# Patient Record
Sex: FEMALE | Race: WHITE | NOT HISPANIC OR LATINO | Employment: UNEMPLOYED | URBAN - NONMETROPOLITAN AREA
[De-identification: names, ages, dates, MRNs, and addresses within clinical notes are randomized per-mention and may not be internally consistent; named-entity substitution may affect disease eponyms.]

---

## 2024-02-02 ENCOUNTER — APPOINTMENT (OUTPATIENT)
Dept: OBSTETRICS AND GYNECOLOGY | Facility: CLINIC | Age: 44
End: 2024-02-02

## 2024-02-07 ENCOUNTER — APPOINTMENT (OUTPATIENT)
Dept: OBSTETRICS AND GYNECOLOGY | Facility: CLINIC | Age: 44
End: 2024-02-07

## 2024-07-07 ENCOUNTER — HOSPITAL ENCOUNTER (EMERGENCY)
Facility: HOSPITAL | Age: 44
Discharge: HOME | End: 2024-07-08
Attending: EMERGENCY MEDICINE
Payer: COMMERCIAL

## 2024-07-07 DIAGNOSIS — M25.552 ACUTE HIP PAIN, LEFT: ICD-10-CM

## 2024-07-07 DIAGNOSIS — M54.16 LUMBAR RADICULOPATHY: Primary | ICD-10-CM

## 2024-07-07 DIAGNOSIS — S76.012A STRAIN OF LEFT HIP, INITIAL ENCOUNTER: ICD-10-CM

## 2024-07-07 PROCEDURE — 99284 EMERGENCY DEPT VISIT MOD MDM: CPT

## 2024-07-07 ASSESSMENT — COLUMBIA-SUICIDE SEVERITY RATING SCALE - C-SSRS
2. HAVE YOU ACTUALLY HAD ANY THOUGHTS OF KILLING YOURSELF?: NO
2. HAVE YOU ACTUALLY HAD ANY THOUGHTS OF KILLING YOURSELF?: NO
1. IN THE PAST MONTH, HAVE YOU WISHED YOU WERE DEAD OR WISHED YOU COULD GO TO SLEEP AND NOT WAKE UP?: NO
1. IN THE PAST MONTH, HAVE YOU WISHED YOU WERE DEAD OR WISHED YOU COULD GO TO SLEEP AND NOT WAKE UP?: NO
6. HAVE YOU EVER DONE ANYTHING, STARTED TO DO ANYTHING, OR PREPARED TO DO ANYTHING TO END YOUR LIFE?: NO

## 2024-07-07 ASSESSMENT — PAIN SCALES - GENERAL: PAINLEVEL_OUTOF10: 10 - WORST POSSIBLE PAIN

## 2024-07-07 ASSESSMENT — PAIN DESCRIPTION - LOCATION: LOCATION: HIP

## 2024-07-07 ASSESSMENT — PAIN - FUNCTIONAL ASSESSMENT: PAIN_FUNCTIONAL_ASSESSMENT: 0-10

## 2024-07-08 ENCOUNTER — APPOINTMENT (OUTPATIENT)
Dept: RADIOLOGY | Facility: HOSPITAL | Age: 44
End: 2024-07-08
Payer: COMMERCIAL

## 2024-07-08 VITALS
HEIGHT: 60 IN | SYSTOLIC BLOOD PRESSURE: 163 MMHG | OXYGEN SATURATION: 100 % | TEMPERATURE: 98.4 F | RESPIRATION RATE: 18 BRPM | DIASTOLIC BLOOD PRESSURE: 95 MMHG | HEART RATE: 98 BPM | WEIGHT: 206 LBS | BODY MASS INDEX: 40.44 KG/M2

## 2024-07-08 PROCEDURE — 72100 X-RAY EXAM L-S SPINE 2/3 VWS: CPT | Mod: FOREIGN READ | Performed by: RADIOLOGY

## 2024-07-08 PROCEDURE — 73502 X-RAY EXAM HIP UNI 2-3 VIEWS: CPT | Mod: LT

## 2024-07-08 PROCEDURE — 96372 THER/PROPH/DIAG INJ SC/IM: CPT

## 2024-07-08 PROCEDURE — 2500000004 HC RX 250 GENERAL PHARMACY W/ HCPCS (ALT 636 FOR OP/ED)

## 2024-07-08 PROCEDURE — 72100 X-RAY EXAM L-S SPINE 2/3 VWS: CPT

## 2024-07-08 PROCEDURE — 73502 X-RAY EXAM HIP UNI 2-3 VIEWS: CPT | Mod: LEFT SIDE | Performed by: RADIOLOGY

## 2024-07-08 PROCEDURE — 2500000001 HC RX 250 WO HCPCS SELF ADMINISTERED DRUGS (ALT 637 FOR MEDICARE OP)

## 2024-07-08 RX ORDER — CYCLOBENZAPRINE HCL 10 MG
10 TABLET ORAL 3 TIMES DAILY PRN
Qty: 20 TABLET | Refills: 0 | Status: SHIPPED | OUTPATIENT
Start: 2024-07-08

## 2024-07-08 RX ORDER — PREDNISONE 20 MG/1
TABLET ORAL
Status: COMPLETED
Start: 2024-07-08 | End: 2024-07-08

## 2024-07-08 RX ORDER — KETOROLAC TROMETHAMINE 30 MG/ML
INJECTION, SOLUTION INTRAMUSCULAR; INTRAVENOUS
Status: COMPLETED
Start: 2024-07-08 | End: 2024-07-08

## 2024-07-08 RX ORDER — PREDNISONE 10 MG/1
TABLET ORAL DAILY
Qty: 30 TABLET | Refills: 0 | Status: SHIPPED | OUTPATIENT
Start: 2024-07-08 | End: 2024-07-19

## 2024-07-08 RX ORDER — KETOROLAC TROMETHAMINE 30 MG/ML
30 INJECTION, SOLUTION INTRAMUSCULAR; INTRAVENOUS ONCE
Status: COMPLETED | OUTPATIENT
Start: 2024-07-08 | End: 2024-07-08

## 2024-07-08 RX ORDER — PREDNISONE 20 MG/1
40 TABLET ORAL ONCE
Status: COMPLETED | OUTPATIENT
Start: 2024-07-08 | End: 2024-07-08

## 2024-07-08 RX ORDER — LIDOCAINE 50 MG/G
1 PATCH TOPICAL DAILY
Qty: 15 PATCH | Refills: 0 | Status: SHIPPED | OUTPATIENT
Start: 2024-07-08

## 2024-07-08 RX ORDER — NAPROXEN 500 MG/1
500 TABLET ORAL 2 TIMES DAILY PRN
Qty: 28 TABLET | Refills: 0 | Status: SHIPPED | OUTPATIENT
Start: 2024-07-08 | End: 2024-07-22

## 2024-07-08 RX ORDER — CYCLOBENZAPRINE HCL 5 MG
TABLET ORAL
Status: COMPLETED
Start: 2024-07-08 | End: 2024-07-08

## 2024-07-08 RX ORDER — CYCLOBENZAPRINE HCL 5 MG
10 TABLET ORAL ONCE
Status: COMPLETED | OUTPATIENT
Start: 2024-07-08 | End: 2024-07-08

## 2024-07-08 RX ADMIN — CYCLOBENZAPRINE HYDROCHLORIDE 10 MG: 5 TABLET, FILM COATED ORAL at 00:15

## 2024-07-08 RX ADMIN — KETOROLAC TROMETHAMINE 30 MG: 30 INJECTION, SOLUTION INTRAMUSCULAR; INTRAVENOUS at 00:15

## 2024-07-08 RX ADMIN — PREDNISONE 40 MG: 20 TABLET ORAL at 00:15

## 2024-07-08 RX ADMIN — Medication 10 MG: at 00:15

## 2024-07-08 RX ADMIN — KETOROLAC TROMETHAMINE 30 MG: 30 INJECTION, SOLUTION INTRAMUSCULAR at 00:15

## 2024-07-08 ASSESSMENT — PAIN SCALES - GENERAL
PAINLEVEL_OUTOF10: 7
PAINLEVEL_OUTOF10: 10 - WORST POSSIBLE PAIN

## 2024-07-08 ASSESSMENT — PAIN DESCRIPTION - PROGRESSION: CLINICAL_PROGRESSION: GRADUALLY IMPROVING

## 2024-07-08 ASSESSMENT — PAIN DESCRIPTION - ORIENTATION: ORIENTATION: LEFT

## 2024-07-08 ASSESSMENT — PAIN - FUNCTIONAL ASSESSMENT: PAIN_FUNCTIONAL_ASSESSMENT: 0-10

## 2024-07-08 ASSESSMENT — PAIN DESCRIPTION - DESCRIPTORS: DESCRIPTORS: ACHING;SHARP

## 2024-07-08 ASSESSMENT — PAIN DESCRIPTION - LOCATION
LOCATION: HIP
LOCATION: HIP

## 2024-07-08 ASSESSMENT — PAIN DESCRIPTION - PAIN TYPE: TYPE: ACUTE PAIN

## 2024-07-08 ASSESSMENT — PAIN DESCRIPTION - FREQUENCY: FREQUENCY: CONSTANT/CONTINUOUS

## 2024-07-08 NOTE — ED TRIAGE NOTES
Patient thinks she may of popped her hip out. She had the same symptoms last year when it happened. Complaining of left hip and left leg pain. Happened Saturday morning

## 2024-07-08 NOTE — ED NOTES
Patient given discharge instructions and verbalizes understanding. Pt aware of prescriptions ordered and denies any further questions. Pt left ambulatory with .      Estefania Liriano RN  07/08/24 4567

## 2024-07-08 NOTE — ED PROVIDER NOTES
HPI   Chief Complaint   Patient presents with    Hip Pain    Leg Pain       44-year-old female presents for evaluation of left hip pain.  She injured her left hip after stepping down out of her camper.  She has been trying stretching without relief.  No direct trauma to the left hip.  No bowel or bladder incontinence saddle anesthesia or leg weakness.  No other associated injuries.  She just finished her menstrual cycle.  Denies pregnancy.      History provided by:  Patient and spouse                      [place] Coma Scale Score: 15                     Patient History   History reviewed. No pertinent past medical history.  History reviewed. No pertinent surgical history.  No family history on file.  Social History     Tobacco Use    Smoking status: Never    Smokeless tobacco: Never   Vaping Use    Vaping status: Never Used   Substance Use Topics    Alcohol use: Never    Drug use: Never       Physical Exam   ED Triage Vitals [07/07/24 2334]   Temperature Heart Rate Respirations BP   36.9 °C (98.4 °F) (!) 110 20 (!) 187/95      Pulse Ox Temp Source Heart Rate Source Patient Position   (!) 10 % Tympanic -- --      BP Location FiO2 (%)     -- --       Physical Exam  Vitals and nursing note reviewed.   Constitutional:       General: She is not in acute distress.     Appearance: She is well-developed.   HENT:      Head: Normocephalic and atraumatic.   Eyes:      Conjunctiva/sclera: Conjunctivae normal.   Cardiovascular:      Rate and Rhythm: Normal rate and regular rhythm.      Heart sounds: No murmur heard.  Pulmonary:      Effort: Pulmonary effort is normal. No respiratory distress.      Breath sounds: Normal breath sounds.   Abdominal:      Palpations: Abdomen is soft.      Tenderness: There is no abdominal tenderness.   Musculoskeletal:         General: Tenderness present. No swelling.      Cervical back: Neck supple.      Comments: Left hip tenderness to palpation without deformity   Skin:     General: Skin is warm  and dry.      Capillary Refill: Capillary refill takes less than 2 seconds.   Neurological:      Mental Status: She is alert.   Psychiatric:         Mood and Affect: Mood normal.         ED Course & MDM   ED Course as of 07/08/24 0243 Mon Jul 08, 2024   0003 44-year-old female presents with left hip pain.  Atraumatic.  Toradol for pain.  She just finished her menstrual cycle and denies pregnancy.  X-ray left hip with AP pelvis.  Toradol for pain. [BT]   0040 XR lumbar spine 2-3 views  Independent interpretation of x-ray lumbar spine preliminary read by myself negative for compression fracture or subluxation. [BT]   0042 XR hip left with pelvis when performed 2 or 3 views  Independent interpretation of x-ray left hip and AP pelvis read by myself x-ray left hip with AP pelvis negative for pelvic ring fracture.  No traumatic fracture of left hip. [BT]   0203 Patient ambulatory to the bathroom with a steady gait [BT]   0229 Final reads of x-ray of lumbar spine and x-ray left hip with AP pelvis negative for fractures or traumatic malalignment of the lumbar spine. [BT]   0229 Pain improved with treatment. [BT]   0242 I considered admission.  Pain improved with treatment.  She is ambulatory.  Most likely lumbar radiculopathy.  No traumatic injuries on imaging.    She is safe for trial of outpatient management.  She was prescribed Flexeril, prednisone taper, Naprosyn, and lidocaine patches for home.    Referred to PCP for follow up.  Advised to return for repeat evaluation in 12 hours or sooner with intractable pain or any other concerns.  Patient agreeable with plan and verbalized understanding.  Discharged home. [BT]      ED Course User Index  [BT] Omero Young DO         Diagnoses as of 07/08/24 0243   Lumbar radiculopathy   Acute hip pain, left   Strain of left hip, initial encounter       Medical Decision Making      Procedure  Procedures     Omero Young,   07/08/24 0243

## 2024-07-08 NOTE — ED NOTES
Patient states she just got off her period, no chance for pregnancy. OK to not wait for pregnancy test for xray per Dr. Young.     Estefania Liriano RN  07/08/24 0031

## 2024-07-08 NOTE — ED NOTES
"Patient states she was stepping down her camper steps when her leg stretched too far and she hurt her left hip. She then attempted to do stretches that she learned last time she injured her hip and she states that she heard a \"pop\". Pt states that she has been an excruciating pain ever since.      Estefania Liriano RN  07/07/24 5542    "

## 2024-08-02 ENCOUNTER — APPOINTMENT (OUTPATIENT)
Dept: RADIOLOGY | Facility: HOSPITAL | Age: 44
End: 2024-08-02
Payer: COMMERCIAL

## 2024-08-02 ENCOUNTER — HOSPITAL ENCOUNTER (EMERGENCY)
Facility: HOSPITAL | Age: 44
Discharge: HOME | End: 2024-08-02
Payer: COMMERCIAL

## 2024-08-02 VITALS
RESPIRATION RATE: 15 BRPM | HEART RATE: 96 BPM | OXYGEN SATURATION: 99 % | WEIGHT: 206 LBS | BODY MASS INDEX: 40.44 KG/M2 | TEMPERATURE: 98.4 F | SYSTOLIC BLOOD PRESSURE: 145 MMHG | DIASTOLIC BLOOD PRESSURE: 78 MMHG | HEIGHT: 60 IN

## 2024-08-02 DIAGNOSIS — M54.41 ACUTE BILATERAL LOW BACK PAIN WITH BILATERAL SCIATICA: Primary | ICD-10-CM

## 2024-08-02 DIAGNOSIS — M54.42 ACUTE BILATERAL LOW BACK PAIN WITH BILATERAL SCIATICA: Primary | ICD-10-CM

## 2024-08-02 PROCEDURE — 99283 EMERGENCY DEPT VISIT LOW MDM: CPT

## 2024-08-02 PROCEDURE — 72100 X-RAY EXAM L-S SPINE 2/3 VWS: CPT

## 2024-08-02 PROCEDURE — 72100 X-RAY EXAM L-S SPINE 2/3 VWS: CPT | Performed by: RADIOLOGY

## 2024-08-02 PROCEDURE — 2500000004 HC RX 250 GENERAL PHARMACY W/ HCPCS (ALT 636 FOR OP/ED)

## 2024-08-02 PROCEDURE — 96372 THER/PROPH/DIAG INJ SC/IM: CPT

## 2024-08-02 RX ORDER — CYCLOBENZAPRINE HCL 10 MG
10 TABLET ORAL 2 TIMES DAILY PRN
Qty: 20 TABLET | Refills: 0 | Status: SHIPPED | OUTPATIENT
Start: 2024-08-02 | End: 2024-08-12

## 2024-08-02 RX ORDER — IBUPROFEN 800 MG/1
800 TABLET ORAL 3 TIMES DAILY
Qty: 21 TABLET | Refills: 0 | Status: SHIPPED | OUTPATIENT
Start: 2024-08-02 | End: 2024-08-09

## 2024-08-02 RX ORDER — ORPHENADRINE CITRATE 30 MG/ML
60 INJECTION INTRAMUSCULAR; INTRAVENOUS ONCE
Status: COMPLETED | OUTPATIENT
Start: 2024-08-02 | End: 2024-08-02

## 2024-08-02 RX ORDER — OXYCODONE AND ACETAMINOPHEN 5; 325 MG/1; MG/1
1 TABLET ORAL EVERY 6 HOURS PRN
Qty: 12 TABLET | Refills: 0 | Status: SHIPPED | OUTPATIENT
Start: 2024-08-02 | End: 2024-08-05

## 2024-08-02 RX ORDER — KETOROLAC TROMETHAMINE 30 MG/ML
30 INJECTION, SOLUTION INTRAMUSCULAR; INTRAVENOUS ONCE
Status: COMPLETED | OUTPATIENT
Start: 2024-08-02 | End: 2024-08-02

## 2024-08-02 RX ADMIN — ORPHENADRINE CITRATE 60 MG: 60 INJECTION INTRAMUSCULAR; INTRAVENOUS at 16:57

## 2024-08-02 RX ADMIN — KETOROLAC TROMETHAMINE 30 MG: 30 INJECTION, SOLUTION INTRAMUSCULAR at 16:57

## 2024-08-02 ASSESSMENT — PAIN DESCRIPTION - LOCATION: LOCATION: BACK

## 2024-08-02 ASSESSMENT — PAIN DESCRIPTION - PAIN TYPE: TYPE: ACUTE PAIN

## 2024-08-02 ASSESSMENT — PAIN DESCRIPTION - ONSET: ONSET: AWAKENED FROM SLEEP

## 2024-08-02 ASSESSMENT — COLUMBIA-SUICIDE SEVERITY RATING SCALE - C-SSRS
6. HAVE YOU EVER DONE ANYTHING, STARTED TO DO ANYTHING, OR PREPARED TO DO ANYTHING TO END YOUR LIFE?: NO
1. IN THE PAST MONTH, HAVE YOU WISHED YOU WERE DEAD OR WISHED YOU COULD GO TO SLEEP AND NOT WAKE UP?: NO
2. HAVE YOU ACTUALLY HAD ANY THOUGHTS OF KILLING YOURSELF?: NO

## 2024-08-02 ASSESSMENT — PAIN DESCRIPTION - PROGRESSION: CLINICAL_PROGRESSION: NOT CHANGED

## 2024-08-02 ASSESSMENT — PAIN DESCRIPTION - ORIENTATION: ORIENTATION: MID

## 2024-08-02 ASSESSMENT — PAIN DESCRIPTION - DESCRIPTORS: DESCRIPTORS: ACHING

## 2024-08-02 ASSESSMENT — PAIN DESCRIPTION - FREQUENCY: FREQUENCY: CONSTANT/CONTINUOUS

## 2024-08-02 ASSESSMENT — PAIN - FUNCTIONAL ASSESSMENT: PAIN_FUNCTIONAL_ASSESSMENT: 0-10

## 2024-08-02 ASSESSMENT — PAIN SCALES - GENERAL
PAINLEVEL_OUTOF10: 3
PAINLEVEL_OUTOF10: 8
PAINLEVEL_OUTOF10: 8

## 2024-08-02 NOTE — ED PROVIDER NOTES
HPI   Chief Complaint   Patient presents with    Back Pain     Fell three days ago having lower back pain today       HPI  Patient is a 44-year-old female who presents to ED for lower back pain x 3 days.  Patient states she fell 3 days ago.  She states she fell on her butt, denies any head injury or blood thinner use.  Denies any other injuries to the extremities.  She complains of lumbar strain with bilateral sciatica.  Patient denies any back pain red flags.  She denies any specific complaints of fever or chills, cough or congestion, headache or dizziness, abdominal pain or NVD, chest pain or shortness of breath, urinary symptoms, IV drug use, saddle anesthesia, incontinence of bowel or bladder.  Denies any recent hospitalizations or surgeries.  Denies any recent travel or sick contacts.      Patient History   History reviewed. No pertinent past medical history.  History reviewed. No pertinent surgical history.  No family history on file.  Social History     Tobacco Use    Smoking status: Never    Smokeless tobacco: Never   Vaping Use    Vaping status: Never Used   Substance Use Topics    Alcohol use: Never    Drug use: Never       Physical Exam   ED Triage Vitals   Temperature Heart Rate Respirations BP   08/02/24 1459 08/02/24 1459 08/02/24 1459 08/02/24 1501   37 °C (98.6 °F) (!) 120 20 (!) 167/111      Pulse Ox Temp Source Heart Rate Source Patient Position   08/02/24 1459 08/02/24 1459 08/02/24 1459 08/02/24 1459   100 % Oral Monitor Sitting      BP Location FiO2 (%)     08/02/24 1459 --     Right arm        Physical Exam  Vitals reviewed.   Constitutional:       Appearance: Normal appearance.   HENT:      Head: Normocephalic and atraumatic.   Eyes:      Extraocular Movements: Extraocular movements intact.   Cardiovascular:      Rate and Rhythm: Normal rate and regular rhythm.   Pulmonary:      Effort: Pulmonary effort is normal.      Breath sounds: Normal breath sounds.   Abdominal:      General: Abdomen is  flat.      Palpations: Abdomen is soft.      Tenderness: There is no abdominal tenderness.   Musculoskeletal:         General: Normal range of motion.      Cervical back: Normal range of motion and neck supple.   Skin:     General: Skin is warm and dry.   Neurological:      General: No focal deficit present.      Mental Status: She is alert and oriented to person, place, and time.      Sensory: Sensation is intact.      Motor: Motor function is intact.      Coordination: Coordination is intact.      Gait: Gait is intact.      Comments: No focal neurologic deficits.  No sensory deficits.  Patient is ambulatory independently.   Psychiatric:         Mood and Affect: Mood normal.         Behavior: Behavior normal.           ED Course & MDM   Diagnoses as of 08/02/24 1817   Acute bilateral low back pain with bilateral sciatica                       [place] Coma Scale Score: 15                        Medical Decision Making  Parts of this chart have been completed using voice recognition software. Please excuse any errors of transcription.  My thought process and reason for plan has been formulated from the time that I saw the patient until the time of disposition and is not specific to one specific moment during their visit and furthermore my MDM encompasses this entire chart and not only this text box.    HPI:   Detailed above.    Exam:   A medically appropriate exam performed, outlined above, given the known history and presentation.    History obtained from:   Patient    EKG/Cardiac monitor:     Social Determinants of Health considered during this visit:       Medications given during visit:  Medications   ketorolac (Toradol) injection 30 mg (30 mg intramuscular Given 8/2/24 1657)   orphenadrine (Norflex) injection 60 mg (60 mg intramuscular Given 8/2/24 1657)        Diagnostic/tests:  Labs Reviewed - No data to display   XR lumbar spine 2-3 views   Final Result   1. Mild diffuse degenerative changes as above.         MACRO:   None.        Signed by: Roxanne Lee 8/2/2024 5:18 PM   Dictation workstation:   [id number]           Differential Diagnosis:   As I have deemed necessary from their history, physical, and studies, I have considered the following diagnoses:     SPINAL CORD COMPRESSION  SPINAL EPIDURAL ABSCESS  METASTATIC CANCER  VERTEBRAL COMPRESSION FRACTURE  RADICULOPATHY  SPINAL STENOSIS  OSTEOARTHRITIS  SCOLIOSIS  PIRIFORMIS SYNDROME  LUMBAR STRAIN  SCIATICA    No emergent MRI indicated  I completed a structured, evidence-based clinical evaluation to screen for acute non-traumatic spinal emergencies. The patient has a normal detailed neurologic exam and a low red flag score. The evidence indicates that the patient is very low risk for an acute spinal emergency and this is consistent with my clinical intuition. The risk of further workup is higher than the likelihood of the patient having a spinal epidural abscess or other dangerous emergency spinal condition. It is, therefore, in the patient´s best interest not to do additional emergent testing at this time. I have discussed with the patient my clinical impression and the result of an evidence-based clinical evaluation to screen for spinal epidural abscess and other spinal emergencies, as well as the risk of further testing and hospitalization. The evidence shows that the risk for an acute spinal emergency is less than 1%. Although the risk of an acute spinal emergency has not been completely eliminated, the risks of further testing likely exceed any potential benefit, and the patient agrees with not pursuing further emergent evaluation for causes of back pain at this time.    BACK PAIN RED FLAGS    Minor (1 Point Each)  Alcohol Abuse  Diabetes Mellitus  Renal Failure  Night Pain  3rd Visit in last 20 days    Major (3 Points Each)  IV Drug Use  Fever without focus  Recent/Current Systemic Infection  Immunosuppression  Recent Spinal  Fracture/Procedure  Incontinence or Retention  Indwelling Urinary Catheter    Consultations:      Procedures:      Critical Care:      Referrals:      Discharge Prescriptions:      MDM Summary:  No acute findings on x-ray of lumbar spine.  Patient reports significant improvement of symptoms after medications.  Will provide prescriptions for pain management.  Patient will follow-up with primary care provider.  Return precautions were discussed.  Patient understands and agrees with this plan.    We have discussed the diagnosis and risks, and we agree with discharging home to follow-up with appropriate physician as directed. We also discussed returning to the Emergency Department immediately if new or worsening symptoms occur. We have discussed the symptoms which are most concerning that necessitate immediate return. Pt symptoms have been well controlled here and the patient is safe for discharge with appropriate outpatient follow up. The patient has verbalized understanding to return to ER without delay for new or worsening pains or for any other symptoms or concerns. I utilized the discharge clinical management tool provided [employer] to help estimate risk of negative outcome for this patient.      Disposition:  The patient was discharged      Procedure  Procedures     Maury Ballesteros PA-C  08/02/24 1862

## 2024-08-30 ENCOUNTER — APPOINTMENT (OUTPATIENT)
Dept: RADIOLOGY | Facility: HOSPITAL | Age: 44
End: 2024-08-30
Payer: COMMERCIAL

## 2024-08-30 ENCOUNTER — HOSPITAL ENCOUNTER (EMERGENCY)
Facility: HOSPITAL | Age: 44
Discharge: HOME | End: 2024-08-30
Attending: STUDENT IN AN ORGANIZED HEALTH CARE EDUCATION/TRAINING PROGRAM
Payer: COMMERCIAL

## 2024-08-30 ENCOUNTER — APPOINTMENT (OUTPATIENT)
Dept: CARDIOLOGY | Facility: HOSPITAL | Age: 44
End: 2024-08-30
Payer: COMMERCIAL

## 2024-08-30 VITALS
BODY MASS INDEX: 43.19 KG/M2 | HEIGHT: 60 IN | RESPIRATION RATE: 16 BRPM | OXYGEN SATURATION: 98 % | TEMPERATURE: 98.2 F | SYSTOLIC BLOOD PRESSURE: 195 MMHG | WEIGHT: 220 LBS | DIASTOLIC BLOOD PRESSURE: 105 MMHG | HEART RATE: 102 BPM

## 2024-08-30 DIAGNOSIS — M54.9 BACK PAIN, UNSPECIFIED BACK LOCATION, UNSPECIFIED BACK PAIN LATERALITY, UNSPECIFIED CHRONICITY: ICD-10-CM

## 2024-08-30 DIAGNOSIS — M54.30 SCIATIC LEG PAIN: Primary | ICD-10-CM

## 2024-08-30 LAB
ALBUMIN SERPL BCP-MCNC: 4.2 G/DL (ref 3.4–5)
ALP SERPL-CCNC: 58 U/L (ref 33–110)
ALT SERPL W P-5'-P-CCNC: 14 U/L (ref 7–45)
AMORPH CRY #/AREA UR COMP ASSIST: ABNORMAL /HPF
ANION GAP SERPL CALC-SCNC: 13 MMOL/L (ref 10–20)
APPEARANCE UR: CLEAR
AST SERPL W P-5'-P-CCNC: 15 U/L (ref 9–39)
ATRIAL RATE: 109 BPM
BACTERIA #/AREA URNS AUTO: ABNORMAL /HPF
BASOPHILS # BLD AUTO: 0.07 X10*3/UL (ref 0–0.1)
BASOPHILS NFR BLD AUTO: 0.6 %
BILIRUB SERPL-MCNC: 0.4 MG/DL (ref 0–1.2)
BILIRUB UR STRIP.AUTO-MCNC: NEGATIVE MG/DL
BUN SERPL-MCNC: 13 MG/DL (ref 6–23)
CALCIUM SERPL-MCNC: 9.9 MG/DL (ref 8.6–10.3)
CARDIAC TROPONIN I PNL SERPL HS: 7 NG/L (ref 0–13)
CHLORIDE SERPL-SCNC: 96 MMOL/L (ref 98–107)
CO2 SERPL-SCNC: 30 MMOL/L (ref 21–32)
COLOR UR: ABNORMAL
CREAT SERPL-MCNC: 0.8 MG/DL (ref 0.5–1.05)
EGFRCR SERPLBLD CKD-EPI 2021: >90 ML/MIN/1.73M*2
EOSINOPHIL # BLD AUTO: 0.24 X10*3/UL (ref 0–0.7)
EOSINOPHIL NFR BLD AUTO: 2.1 %
ERYTHROCYTE [DISTWIDTH] IN BLOOD BY AUTOMATED COUNT: 14.1 % (ref 11.5–14.5)
FLUAV RNA RESP QL NAA+PROBE: NOT DETECTED
FLUBV RNA RESP QL NAA+PROBE: NOT DETECTED
GLUCOSE SERPL-MCNC: 113 MG/DL (ref 74–99)
GLUCOSE UR STRIP.AUTO-MCNC: NORMAL MG/DL
HCT VFR BLD AUTO: 39.1 % (ref 36–46)
HGB BLD-MCNC: 12.3 G/DL (ref 12–16)
HOLD SPECIMEN: NORMAL
IMM GRANULOCYTES # BLD AUTO: 0.05 X10*3/UL (ref 0–0.7)
IMM GRANULOCYTES NFR BLD AUTO: 0.4 % (ref 0–0.9)
KETONES UR STRIP.AUTO-MCNC: NEGATIVE MG/DL
LACTATE SERPL-SCNC: 1.1 MMOL/L (ref 0.4–2)
LEUKOCYTE ESTERASE UR QL STRIP.AUTO: NEGATIVE
LIPASE SERPL-CCNC: 16 U/L (ref 9–82)
LYMPHOCYTES # BLD AUTO: 2.19 X10*3/UL (ref 1.2–4.8)
LYMPHOCYTES NFR BLD AUTO: 19 %
MAGNESIUM SERPL-MCNC: 1.98 MG/DL (ref 1.6–2.4)
MCH RBC QN AUTO: 26.5 PG (ref 26–34)
MCHC RBC AUTO-ENTMCNC: 31.5 G/DL (ref 32–36)
MCV RBC AUTO: 84 FL (ref 80–100)
MONOCYTES # BLD AUTO: 1.09 X10*3/UL (ref 0.1–1)
MONOCYTES NFR BLD AUTO: 9.5 %
MUCOUS THREADS #/AREA URNS AUTO: ABNORMAL /LPF
NEUTROPHILS # BLD AUTO: 7.88 X10*3/UL (ref 1.2–7.7)
NEUTROPHILS NFR BLD AUTO: 68.4 %
NITRITE UR QL STRIP.AUTO: NEGATIVE
NRBC BLD-RTO: 0 /100 WBCS (ref 0–0)
P AXIS: 71 DEGREES
P OFFSET: 185 MS
P ONSET: 128 MS
PH UR STRIP.AUTO: 6.5 [PH]
PLATELET # BLD AUTO: 317 X10*3/UL (ref 150–450)
POTASSIUM SERPL-SCNC: 3.6 MMOL/L (ref 3.5–5.3)
PR INTERVAL: 184 MS
PROT SERPL-MCNC: 7.4 G/DL (ref 6.4–8.2)
PROT UR STRIP.AUTO-MCNC: NEGATIVE MG/DL
Q ONSET: 220 MS
QRS COUNT: 17 BEATS
QRS DURATION: 90 MS
QT INTERVAL: 334 MS
QTC CALCULATION(BAZETT): 449 MS
QTC FREDERICIA: 407 MS
R AXIS: 53 DEGREES
RBC # BLD AUTO: 4.64 X10*6/UL (ref 4–5.2)
RBC # UR STRIP.AUTO: ABNORMAL /UL
RBC #/AREA URNS AUTO: ABNORMAL /HPF
SARS-COV-2 RNA RESP QL NAA+PROBE: NOT DETECTED
SODIUM SERPL-SCNC: 135 MMOL/L (ref 136–145)
SP GR UR STRIP.AUTO: 1.01
SQUAMOUS #/AREA URNS AUTO: ABNORMAL /HPF
T AXIS: 80 DEGREES
T OFFSET: 387 MS
UROBILINOGEN UR STRIP.AUTO-MCNC: NORMAL MG/DL
VENTRICULAR RATE: 109 BPM
WBC # BLD AUTO: 11.5 X10*3/UL (ref 4.4–11.3)
WBC #/AREA URNS AUTO: ABNORMAL /HPF

## 2024-08-30 PROCEDURE — 99285 EMERGENCY DEPT VISIT HI MDM: CPT | Mod: 25

## 2024-08-30 PROCEDURE — 2500000004 HC RX 250 GENERAL PHARMACY W/ HCPCS (ALT 636 FOR OP/ED): Performed by: STUDENT IN AN ORGANIZED HEALTH CARE EDUCATION/TRAINING PROGRAM

## 2024-08-30 PROCEDURE — 84484 ASSAY OF TROPONIN QUANT: CPT | Performed by: STUDENT IN AN ORGANIZED HEALTH CARE EDUCATION/TRAINING PROGRAM

## 2024-08-30 PROCEDURE — 96375 TX/PRO/DX INJ NEW DRUG ADDON: CPT

## 2024-08-30 PROCEDURE — 74177 CT ABD & PELVIS W/CONTRAST: CPT | Performed by: RADIOLOGY

## 2024-08-30 PROCEDURE — 93005 ELECTROCARDIOGRAM TRACING: CPT

## 2024-08-30 PROCEDURE — 80053 COMPREHEN METABOLIC PANEL: CPT | Performed by: STUDENT IN AN ORGANIZED HEALTH CARE EDUCATION/TRAINING PROGRAM

## 2024-08-30 PROCEDURE — 83735 ASSAY OF MAGNESIUM: CPT | Performed by: STUDENT IN AN ORGANIZED HEALTH CARE EDUCATION/TRAINING PROGRAM

## 2024-08-30 PROCEDURE — 87635 SARS-COV-2 COVID-19 AMP PRB: CPT | Performed by: STUDENT IN AN ORGANIZED HEALTH CARE EDUCATION/TRAINING PROGRAM

## 2024-08-30 PROCEDURE — 71045 X-RAY EXAM CHEST 1 VIEW: CPT | Performed by: RADIOLOGY

## 2024-08-30 PROCEDURE — 83690 ASSAY OF LIPASE: CPT | Performed by: STUDENT IN AN ORGANIZED HEALTH CARE EDUCATION/TRAINING PROGRAM

## 2024-08-30 PROCEDURE — 85025 COMPLETE CBC W/AUTO DIFF WBC: CPT | Performed by: STUDENT IN AN ORGANIZED HEALTH CARE EDUCATION/TRAINING PROGRAM

## 2024-08-30 PROCEDURE — 74177 CT ABD & PELVIS W/CONTRAST: CPT

## 2024-08-30 PROCEDURE — 2550000001 HC RX 255 CONTRASTS: Performed by: STUDENT IN AN ORGANIZED HEALTH CARE EDUCATION/TRAINING PROGRAM

## 2024-08-30 PROCEDURE — 83605 ASSAY OF LACTIC ACID: CPT | Performed by: STUDENT IN AN ORGANIZED HEALTH CARE EDUCATION/TRAINING PROGRAM

## 2024-08-30 PROCEDURE — 71045 X-RAY EXAM CHEST 1 VIEW: CPT

## 2024-08-30 PROCEDURE — 81001 URINALYSIS AUTO W/SCOPE: CPT | Performed by: STUDENT IN AN ORGANIZED HEALTH CARE EDUCATION/TRAINING PROGRAM

## 2024-08-30 PROCEDURE — 96374 THER/PROPH/DIAG INJ IV PUSH: CPT | Mod: 59

## 2024-08-30 PROCEDURE — 36415 COLL VENOUS BLD VENIPUNCTURE: CPT | Performed by: STUDENT IN AN ORGANIZED HEALTH CARE EDUCATION/TRAINING PROGRAM

## 2024-08-30 RX ORDER — KETOROLAC TROMETHAMINE 15 MG/ML
15 INJECTION, SOLUTION INTRAMUSCULAR; INTRAVENOUS ONCE
Status: COMPLETED | OUTPATIENT
Start: 2024-08-30 | End: 2024-08-30

## 2024-08-30 RX ORDER — MORPHINE SULFATE 4 MG/ML
4 INJECTION, SOLUTION INTRAMUSCULAR; INTRAVENOUS ONCE
Status: COMPLETED | OUTPATIENT
Start: 2024-08-30 | End: 2024-08-30

## 2024-08-30 RX ORDER — METHOCARBAMOL 100 MG/ML
1000 INJECTION, SOLUTION INTRAMUSCULAR; INTRAVENOUS ONCE
Status: COMPLETED | OUTPATIENT
Start: 2024-08-30 | End: 2024-08-30

## 2024-08-30 ASSESSMENT — PAIN SCALES - GENERAL
PAINLEVEL_OUTOF10: 10 - WORST POSSIBLE PAIN
PAINLEVEL_OUTOF10: 10 - WORST POSSIBLE PAIN

## 2024-08-30 ASSESSMENT — PAIN - FUNCTIONAL ASSESSMENT: PAIN_FUNCTIONAL_ASSESSMENT: 0-10

## 2024-08-30 ASSESSMENT — PAIN DESCRIPTION - PAIN TYPE: TYPE: ACUTE PAIN

## 2024-08-30 ASSESSMENT — PAIN DESCRIPTION - ORIENTATION
ORIENTATION: LEFT
ORIENTATION: LEFT

## 2024-08-30 ASSESSMENT — PAIN DESCRIPTION - LOCATION
LOCATION: LEG

## 2024-08-30 ASSESSMENT — PAIN DESCRIPTION - DESCRIPTORS: DESCRIPTORS: ACHING

## 2024-08-30 NOTE — ED PROVIDER NOTES
Chief Complaint: Back pain  HPI: This is a 44-year-old female, past medical history significant for sciatic back pain for the last several months, presenting to the emergency department for worsening weakness and tingling of her left lower extremity as well as worsening back pain.  Patient denies any new injury or trauma to her back.  She denies any fevers or chills.  She states that it is difficult for her to urinate and to have bowel movements, however denies any incontinence.  She states she is unable to control the pain at home and so presents to the emergency department.    No past medical history on file.   No past surgical history on file.    Physical Exam  Constitutional:       Appearance: Normal appearance.   HENT:      Head: Normocephalic and atraumatic.      Mouth/Throat:      Mouth: Mucous membranes are moist.   Eyes:      Conjunctiva/sclera: Conjunctivae normal.   Cardiovascular:      Rate and Rhythm: Normal rate.   Pulmonary:      Effort: Pulmonary effort is normal.   Abdominal:      General: Abdomen is flat.      Palpations: Abdomen is soft.   Musculoskeletal:         General: No swelling, tenderness, deformity or signs of injury. Normal range of motion.      Cervical back: Normal range of motion.      Comments: No midline bony tenderness of the C/T/L-spine.   Skin:     General: Skin is warm and dry.   Neurological:      General: No focal deficit present.      Mental Status: She is alert.      Cranial Nerves: No cranial nerve deficit.      Sensory: No sensory deficit.      Motor: No weakness.      Coordination: Coordination normal.   Psychiatric:         Mood and Affect: Mood normal.          ED Course/[facility]  Diagnoses as of 08/30/24 0710   Sciatic leg pain   Back pain, unspecified back location, unspecified back pain laterality, unspecified chronicity       This is a 44 y.o. female presenting to the ED for evaluation of acute on chronic back pain and left lower extremity weakness and tingling which  has been present for the last several months.  On physical exam, the patient appears uncomfortable, resting on her right side.  It is painful for her to roll over onto her back, however she is able to do it.  She has normal sensation, no saddle paresthesias, and she is able to move both upper and lower extremities.  We did do a postvoid residual bladder scan which was 22 cc.  Lab work was also grossly unremarkable.  I have a very low suspicion for cauda equina at this time, as such I do not feel that MRI imaging is indicated.  I did do a CT abdomen pelvis as the patient complains of difficulty with urination and constipation, however CT abdomen pelvis was grossly unremarkable.  Patient was given morphine as well as Robaxin with minimal improvement in the pain.  She was then given 0.5 mg of Dilaudid and Toradol, with significant improvement in her pain.  She feels comfortable with discharge home to continue following up with her spine doctors and physical therapy.  She was encouraged to return to the emergency department for any new or worsening symptoms and was ultimately discharged home in stable condition.    Final Impression  1.  Sciatica  Disposition/Plan: Discharge home  Condition at disposition: Stable.     Stacey Redman DO  Emergency Medicine Physician     Stacey Redman DO  08/30/24 0715

## 2024-08-30 NOTE — ED NOTES
Patient given discharge instructions and verbalizes understanding. Pt brought to waiting room via wheelchair to wait for ride. Pt left with all belongings.      Estefania Liriano RN  08/30/24 0772

## 2024-09-13 ENCOUNTER — APPOINTMENT (OUTPATIENT)
Dept: RADIOLOGY | Facility: HOSPITAL | Age: 44
End: 2024-09-13
Payer: COMMERCIAL

## 2024-09-13 ENCOUNTER — HOSPITAL ENCOUNTER (EMERGENCY)
Facility: HOSPITAL | Age: 44
Discharge: OTHER INSTITUTION | End: 2024-09-14
Attending: STUDENT IN AN ORGANIZED HEALTH CARE EDUCATION/TRAINING PROGRAM
Payer: COMMERCIAL

## 2024-09-13 DIAGNOSIS — G89.29 ACUTE EXACERBATION OF CHRONIC LOW BACK PAIN: Primary | ICD-10-CM

## 2024-09-13 DIAGNOSIS — M54.50 ACUTE EXACERBATION OF CHRONIC LOW BACK PAIN: Primary | ICD-10-CM

## 2024-09-13 LAB
ALBUMIN SERPL-MCNC: 3.9 G/DL (ref 3.5–5)
ALP BLD-CCNC: 70 U/L (ref 35–125)
ALT SERPL-CCNC: 10 U/L (ref 5–40)
ANION GAP SERPL CALC-SCNC: 11 MMOL/L
APPEARANCE UR: CLEAR
AST SERPL-CCNC: 9 U/L (ref 5–40)
BACTERIA #/AREA URNS AUTO: ABNORMAL /HPF
BASOPHILS # BLD AUTO: 0.06 X10*3/UL (ref 0–0.1)
BASOPHILS NFR BLD AUTO: 0.4 %
BILIRUB SERPL-MCNC: 0.3 MG/DL (ref 0.1–1.2)
BILIRUB UR STRIP.AUTO-MCNC: NEGATIVE MG/DL
BUN SERPL-MCNC: 15 MG/DL (ref 8–25)
CALCIUM SERPL-MCNC: 10.1 MG/DL (ref 8.5–10.4)
CHLORIDE SERPL-SCNC: 99 MMOL/L (ref 97–107)
CO2 SERPL-SCNC: 26 MMOL/L (ref 24–31)
COLOR UR: YELLOW
CREAT SERPL-MCNC: 0.7 MG/DL (ref 0.4–1.6)
CRP SERPL-MCNC: 4.6 MG/DL (ref 0–2)
EGFRCR SERPLBLD CKD-EPI 2021: >90 ML/MIN/1.73M*2
EOSINOPHIL # BLD AUTO: 0.13 X10*3/UL (ref 0–0.7)
EOSINOPHIL NFR BLD AUTO: 0.9 %
ERYTHROCYTE [DISTWIDTH] IN BLOOD BY AUTOMATED COUNT: 13.8 % (ref 11.5–14.5)
ERYTHROCYTE [SEDIMENTATION RATE] IN BLOOD BY WESTERGREN METHOD: 36 MM/H (ref 0–20)
GLUCOSE SERPL-MCNC: 114 MG/DL (ref 65–99)
GLUCOSE UR STRIP.AUTO-MCNC: NORMAL MG/DL
HCT VFR BLD AUTO: 37.1 % (ref 36–46)
HGB BLD-MCNC: 11.8 G/DL (ref 12–16)
IMM GRANULOCYTES # BLD AUTO: 0.05 X10*3/UL (ref 0–0.7)
IMM GRANULOCYTES NFR BLD AUTO: 0.3 % (ref 0–0.9)
KETONES UR STRIP.AUTO-MCNC: NEGATIVE MG/DL
LEUKOCYTE ESTERASE UR QL STRIP.AUTO: NEGATIVE
LYMPHOCYTES # BLD AUTO: 1.08 X10*3/UL (ref 1.2–4.8)
LYMPHOCYTES NFR BLD AUTO: 7.1 %
MCH RBC QN AUTO: 26.5 PG (ref 26–34)
MCHC RBC AUTO-ENTMCNC: 31.8 G/DL (ref 32–36)
MCV RBC AUTO: 83 FL (ref 80–100)
MONOCYTES # BLD AUTO: 0.34 X10*3/UL (ref 0.1–1)
MONOCYTES NFR BLD AUTO: 2.2 %
MUCOUS THREADS #/AREA URNS AUTO: ABNORMAL /LPF
NEUTROPHILS # BLD AUTO: 13.61 X10*3/UL (ref 1.2–7.7)
NEUTROPHILS NFR BLD AUTO: 89.1 %
NITRITE UR QL STRIP.AUTO: NEGATIVE
NRBC BLD-RTO: 0 /100 WBCS (ref 0–0)
PH UR STRIP.AUTO: 6 [PH]
PLATELET # BLD AUTO: 363 X10*3/UL (ref 150–450)
POTASSIUM SERPL-SCNC: 3.4 MMOL/L (ref 3.4–5.1)
PROT SERPL-MCNC: 6.9 G/DL (ref 5.9–7.9)
PROT UR STRIP.AUTO-MCNC: ABNORMAL MG/DL
RBC # BLD AUTO: 4.45 X10*6/UL (ref 4–5.2)
RBC # UR STRIP.AUTO: ABNORMAL /UL
RBC #/AREA URNS AUTO: ABNORMAL /HPF
SODIUM SERPL-SCNC: 136 MMOL/L (ref 133–145)
SP GR UR STRIP.AUTO: 1.02
SQUAMOUS #/AREA URNS AUTO: ABNORMAL /HPF
UROBILINOGEN UR STRIP.AUTO-MCNC: NORMAL MG/DL
WBC # BLD AUTO: 15.3 X10*3/UL (ref 4.4–11.3)
WBC #/AREA URNS AUTO: ABNORMAL /HPF

## 2024-09-13 PROCEDURE — 36415 COLL VENOUS BLD VENIPUNCTURE: CPT | Performed by: STUDENT IN AN ORGANIZED HEALTH CARE EDUCATION/TRAINING PROGRAM

## 2024-09-13 PROCEDURE — 86140 C-REACTIVE PROTEIN: CPT | Performed by: STUDENT IN AN ORGANIZED HEALTH CARE EDUCATION/TRAINING PROGRAM

## 2024-09-13 PROCEDURE — 85652 RBC SED RATE AUTOMATED: CPT | Performed by: STUDENT IN AN ORGANIZED HEALTH CARE EDUCATION/TRAINING PROGRAM

## 2024-09-13 PROCEDURE — 72146 MRI CHEST SPINE W/O DYE: CPT | Performed by: STUDENT IN AN ORGANIZED HEALTH CARE EDUCATION/TRAINING PROGRAM

## 2024-09-13 PROCEDURE — 2500000001 HC RX 250 WO HCPCS SELF ADMINISTERED DRUGS (ALT 637 FOR MEDICARE OP)

## 2024-09-13 PROCEDURE — A9575 INJ GADOTERATE MEGLUMI 0.1ML: HCPCS | Performed by: STUDENT IN AN ORGANIZED HEALTH CARE EDUCATION/TRAINING PROGRAM

## 2024-09-13 PROCEDURE — 85025 COMPLETE CBC W/AUTO DIFF WBC: CPT | Performed by: STUDENT IN AN ORGANIZED HEALTH CARE EDUCATION/TRAINING PROGRAM

## 2024-09-13 PROCEDURE — 2500000005 HC RX 250 GENERAL PHARMACY W/O HCPCS

## 2024-09-13 PROCEDURE — 81001 URINALYSIS AUTO W/SCOPE: CPT

## 2024-09-13 PROCEDURE — 80053 COMPREHEN METABOLIC PANEL: CPT | Performed by: STUDENT IN AN ORGANIZED HEALTH CARE EDUCATION/TRAINING PROGRAM

## 2024-09-13 PROCEDURE — 72158 MRI LUMBAR SPINE W/O & W/DYE: CPT

## 2024-09-13 PROCEDURE — 72158 MRI LUMBAR SPINE W/O & W/DYE: CPT | Performed by: STUDENT IN AN ORGANIZED HEALTH CARE EDUCATION/TRAINING PROGRAM

## 2024-09-13 PROCEDURE — 2550000001 HC RX 255 CONTRASTS: Performed by: STUDENT IN AN ORGANIZED HEALTH CARE EDUCATION/TRAINING PROGRAM

## 2024-09-13 PROCEDURE — 99285 EMERGENCY DEPT VISIT HI MDM: CPT | Mod: 25

## 2024-09-13 PROCEDURE — 72146 MRI CHEST SPINE W/O DYE: CPT

## 2024-09-13 PROCEDURE — 2500000004 HC RX 250 GENERAL PHARMACY W/ HCPCS (ALT 636 FOR OP/ED)

## 2024-09-13 RX ORDER — GABAPENTIN 100 MG/1
100 CAPSULE ORAL ONCE
Status: COMPLETED | OUTPATIENT
Start: 2024-09-13 | End: 2024-09-13

## 2024-09-13 RX ORDER — OXYCODONE HYDROCHLORIDE 5 MG/1
5 TABLET ORAL ONCE
Status: COMPLETED | OUTPATIENT
Start: 2024-09-13 | End: 2024-09-13

## 2024-09-13 RX ORDER — LIDOCAINE 560 MG/1
1 PATCH PERCUTANEOUS; TOPICAL; TRANSDERMAL ONCE
Status: DISCONTINUED | OUTPATIENT
Start: 2024-09-13 | End: 2024-09-14 | Stop reason: HOSPADM

## 2024-09-13 RX ORDER — ACETAMINOPHEN 325 MG/1
975 TABLET ORAL ONCE
Status: COMPLETED | OUTPATIENT
Start: 2024-09-13 | End: 2024-09-13

## 2024-09-13 RX ORDER — GADOTERATE MEGLUMINE 376.9 MG/ML
20 INJECTION INTRAVENOUS
Status: COMPLETED | OUTPATIENT
Start: 2024-09-13 | End: 2024-09-13

## 2024-09-13 RX ORDER — PREDNISONE 20 MG/1
60 TABLET ORAL ONCE
Status: COMPLETED | OUTPATIENT
Start: 2024-09-13 | End: 2024-09-13

## 2024-09-13 RX ADMIN — LIDOCAINE 1 PATCH: 4 PATCH TOPICAL at 19:24

## 2024-09-13 RX ADMIN — PREDNISONE 60 MG: 20 TABLET ORAL at 19:22

## 2024-09-13 RX ADMIN — ACETAMINOPHEN 975 MG: 325 TABLET ORAL at 19:22

## 2024-09-13 RX ADMIN — GADOTERATE MEGLUMINE 20 ML: 376.9 INJECTION, SOLUTION INTRAVENOUS at 21:40

## 2024-09-13 RX ADMIN — GABAPENTIN 100 MG: 100 CAPSULE ORAL at 21:57

## 2024-09-13 RX ADMIN — OXYCODONE HYDROCHLORIDE 5 MG: 5 TABLET ORAL at 19:23

## 2024-09-13 ASSESSMENT — PAIN - FUNCTIONAL ASSESSMENT
PAIN_FUNCTIONAL_ASSESSMENT: 0-10
PAIN_FUNCTIONAL_ASSESSMENT: 0-10

## 2024-09-13 ASSESSMENT — PAIN SCALES - GENERAL
PAINLEVEL_OUTOF10: 0 - NO PAIN
PAINLEVEL_OUTOF10: 5 - MODERATE PAIN

## 2024-09-14 ENCOUNTER — APPOINTMENT (OUTPATIENT)
Dept: RADIOLOGY | Facility: HOSPITAL | Age: 44
DRG: 519 | End: 2024-09-14
Payer: COMMERCIAL

## 2024-09-14 ENCOUNTER — HOSPITAL ENCOUNTER (INPATIENT)
Facility: HOSPITAL | Age: 44
LOS: 6 days | Discharge: HOME HEALTH | DRG: 519 | End: 2024-09-20
Attending: NEUROLOGICAL SURGERY | Admitting: NEUROLOGICAL SURGERY
Payer: COMMERCIAL

## 2024-09-14 VITALS
TEMPERATURE: 98.6 F | WEIGHT: 205 LBS | RESPIRATION RATE: 16 BRPM | OXYGEN SATURATION: 99 % | SYSTOLIC BLOOD PRESSURE: 173 MMHG | HEART RATE: 104 BPM | BODY MASS INDEX: 32.95 KG/M2 | DIASTOLIC BLOOD PRESSURE: 107 MMHG | HEIGHT: 66 IN

## 2024-09-14 DIAGNOSIS — Z74.09 IMPAIRED FUNCTIONAL MOBILITY AND ENDURANCE: ICD-10-CM

## 2024-09-14 DIAGNOSIS — Z98.890 S/P SPINAL SURGERY: ICD-10-CM

## 2024-09-14 DIAGNOSIS — F11.90 OPIOID USE DISORDER: ICD-10-CM

## 2024-09-14 DIAGNOSIS — D49.7 INTRADURAL EXTRAMEDULLARY SPINAL TUMOR: Primary | ICD-10-CM

## 2024-09-14 DIAGNOSIS — G89.18 ACUTE POST-OPERATIVE PAIN: ICD-10-CM

## 2024-09-14 PROBLEM — G89.29 ACUTE EXACERBATION OF CHRONIC LOW BACK PAIN: Status: ACTIVE | Noted: 2024-09-14

## 2024-09-14 PROBLEM — M54.50 ACUTE EXACERBATION OF CHRONIC LOW BACK PAIN: Status: ACTIVE | Noted: 2024-09-14

## 2024-09-14 LAB
ABO GROUP (TYPE) IN BLOOD: NORMAL
ALBUMIN SERPL BCP-MCNC: 3.9 G/DL (ref 3.4–5)
ANION GAP SERPL CALC-SCNC: 15 MMOL/L (ref 10–20)
ANTIBODY SCREEN: NORMAL
APPEARANCE UR: CLEAR
APTT PPP: 29 SECONDS (ref 27–38)
B-HCG SERPL-ACNC: <3 MIU/ML
BACTERIA #/AREA URNS AUTO: ABNORMAL /HPF
BILIRUB UR STRIP.AUTO-MCNC: NEGATIVE MG/DL
BUN SERPL-MCNC: 15 MG/DL (ref 6–23)
CALCIUM SERPL-MCNC: 9.8 MG/DL (ref 8.6–10.6)
CHLORIDE SERPL-SCNC: 99 MMOL/L (ref 98–107)
CO2 SERPL-SCNC: 27 MMOL/L (ref 21–32)
COLOR UR: YELLOW
CREAT SERPL-MCNC: 0.63 MG/DL (ref 0.5–1.05)
EGFRCR SERPLBLD CKD-EPI 2021: >90 ML/MIN/1.73M*2
ERYTHROCYTE [DISTWIDTH] IN BLOOD BY AUTOMATED COUNT: 13.8 % (ref 11.5–14.5)
GLUCOSE SERPL-MCNC: 104 MG/DL (ref 74–99)
GLUCOSE UR STRIP.AUTO-MCNC: NORMAL MG/DL
HCT VFR BLD AUTO: 37 % (ref 36–46)
HGB BLD-MCNC: 11.6 G/DL (ref 12–16)
HOLD SPECIMEN: NORMAL
HYALINE CASTS #/AREA URNS AUTO: ABNORMAL /LPF
INR PPP: 1.1 (ref 0.9–1.1)
KETONES UR STRIP.AUTO-MCNC: NEGATIVE MG/DL
LEUKOCYTE ESTERASE UR QL STRIP.AUTO: NEGATIVE
MCH RBC QN AUTO: 26.4 PG (ref 26–34)
MCHC RBC AUTO-ENTMCNC: 31.4 G/DL (ref 32–36)
MCV RBC AUTO: 84 FL (ref 80–100)
MUCOUS THREADS #/AREA URNS AUTO: ABNORMAL /LPF
NITRITE UR QL STRIP.AUTO: NEGATIVE
NRBC BLD-RTO: 0 /100 WBCS (ref 0–0)
PH UR STRIP.AUTO: 6.5 [PH]
PHOSPHATE SERPL-MCNC: 3.7 MG/DL (ref 2.5–4.9)
PLATELET # BLD AUTO: 395 X10*3/UL (ref 150–450)
POTASSIUM SERPL-SCNC: 3.2 MMOL/L (ref 3.5–5.3)
PROT UR STRIP.AUTO-MCNC: ABNORMAL MG/DL
PROTHROMBIN TIME: 12.1 SECONDS (ref 9.8–12.8)
RBC # BLD AUTO: 4.39 X10*6/UL (ref 4–5.2)
RBC # UR STRIP.AUTO: ABNORMAL /UL
RBC #/AREA URNS AUTO: ABNORMAL /HPF
RH FACTOR (ANTIGEN D): NORMAL
SODIUM SERPL-SCNC: 138 MMOL/L (ref 136–145)
SP GR UR STRIP.AUTO: 1.02
SQUAMOUS #/AREA URNS AUTO: ABNORMAL /HPF
UROBILINOGEN UR STRIP.AUTO-MCNC: ABNORMAL MG/DL
WBC # BLD AUTO: 8 X10*3/UL (ref 4.4–11.3)
WBC #/AREA URNS AUTO: ABNORMAL /HPF

## 2024-09-14 PROCEDURE — 2500000004 HC RX 250 GENERAL PHARMACY W/ HCPCS (ALT 636 FOR OP/ED)

## 2024-09-14 PROCEDURE — 71045 X-RAY EXAM CHEST 1 VIEW: CPT | Performed by: STUDENT IN AN ORGANIZED HEALTH CARE EDUCATION/TRAINING PROGRAM

## 2024-09-14 PROCEDURE — 84100 ASSAY OF PHOSPHORUS: CPT

## 2024-09-14 PROCEDURE — 84702 CHORIONIC GONADOTROPIN TEST: CPT

## 2024-09-14 PROCEDURE — 85027 COMPLETE CBC AUTOMATED: CPT

## 2024-09-14 PROCEDURE — 2500000001 HC RX 250 WO HCPCS SELF ADMINISTERED DRUGS (ALT 637 FOR MEDICARE OP)

## 2024-09-14 PROCEDURE — 71045 X-RAY EXAM CHEST 1 VIEW: CPT

## 2024-09-14 PROCEDURE — 2500000005 HC RX 250 GENERAL PHARMACY W/O HCPCS

## 2024-09-14 PROCEDURE — 86901 BLOOD TYPING SEROLOGIC RH(D): CPT

## 2024-09-14 PROCEDURE — 85610 PROTHROMBIN TIME: CPT

## 2024-09-14 PROCEDURE — 80307 DRUG TEST PRSMV CHEM ANLYZR: CPT | Performed by: STUDENT IN AN ORGANIZED HEALTH CARE EDUCATION/TRAINING PROGRAM

## 2024-09-14 PROCEDURE — 1100000001 HC PRIVATE ROOM DAILY

## 2024-09-14 PROCEDURE — 36415 COLL VENOUS BLD VENIPUNCTURE: CPT

## 2024-09-14 PROCEDURE — 81001 URINALYSIS AUTO W/SCOPE: CPT

## 2024-09-14 RX ORDER — ACETAMINOPHEN 500 MG
1000 TABLET ORAL EVERY 8 HOURS
COMMUNITY

## 2024-09-14 RX ORDER — OXYCODONE HYDROCHLORIDE 10 MG/1
10 TABLET ORAL EVERY 4 HOURS PRN
Status: DISCONTINUED | OUTPATIENT
Start: 2024-09-14 | End: 2024-09-20 | Stop reason: HOSPADM

## 2024-09-14 RX ORDER — HYDROMORPHONE HYDROCHLORIDE 1 MG/ML
0.2 INJECTION, SOLUTION INTRAMUSCULAR; INTRAVENOUS; SUBCUTANEOUS
Status: DISCONTINUED | OUTPATIENT
Start: 2024-09-14 | End: 2024-09-18

## 2024-09-14 RX ORDER — POLYETHYLENE GLYCOL 3350 17 G/17G
17 POWDER, FOR SOLUTION ORAL DAILY
Status: DISCONTINUED | OUTPATIENT
Start: 2024-09-14 | End: 2024-09-20 | Stop reason: HOSPADM

## 2024-09-14 RX ORDER — LISINOPRIL AND HYDROCHLOROTHIAZIDE 20; 25 MG/1; MG/1
1 TABLET ORAL NIGHTLY
Status: DISCONTINUED | OUTPATIENT
Start: 2024-09-14 | End: 2024-09-14 | Stop reason: ALTCHOICE

## 2024-09-14 RX ORDER — OXYCODONE HYDROCHLORIDE 5 MG/1
5 TABLET ORAL EVERY 4 HOURS PRN
COMMUNITY
End: 2024-09-20 | Stop reason: HOSPADM

## 2024-09-14 RX ORDER — AMOXICILLIN 250 MG
2 CAPSULE ORAL 2 TIMES DAILY
Status: DISCONTINUED | OUTPATIENT
Start: 2024-09-14 | End: 2024-09-20 | Stop reason: HOSPADM

## 2024-09-14 RX ORDER — IBUPROFEN 200 MG
400 TABLET ORAL EVERY 12 HOURS
COMMUNITY
End: 2024-09-20 | Stop reason: HOSPADM

## 2024-09-14 RX ORDER — HEPARIN SODIUM 5000 [USP'U]/ML
5000 INJECTION, SOLUTION INTRAVENOUS; SUBCUTANEOUS EVERY 8 HOURS
Status: DISCONTINUED | OUTPATIENT
Start: 2024-09-14 | End: 2024-09-16

## 2024-09-14 RX ORDER — LISINOPRIL AND HYDROCHLOROTHIAZIDE 20; 25 MG/1; MG/1
1 TABLET ORAL NIGHTLY
COMMUNITY

## 2024-09-14 RX ORDER — ACETAMINOPHEN 325 MG/1
650 TABLET ORAL EVERY 6 HOURS SCHEDULED
Status: DISCONTINUED | OUTPATIENT
Start: 2024-09-14 | End: 2024-09-16

## 2024-09-14 RX ORDER — OXYCODONE HYDROCHLORIDE 5 MG/1
2.5 TABLET ORAL EVERY 4 HOURS PRN
Status: DISCONTINUED | OUTPATIENT
Start: 2024-09-14 | End: 2024-09-20 | Stop reason: HOSPADM

## 2024-09-14 RX ORDER — CYCLOBENZAPRINE HCL 10 MG
10 TABLET ORAL 3 TIMES DAILY PRN
Status: DISCONTINUED | OUTPATIENT
Start: 2024-09-14 | End: 2024-09-16

## 2024-09-14 RX ORDER — NALOXONE HYDROCHLORIDE 0.4 MG/ML
0.2 INJECTION, SOLUTION INTRAMUSCULAR; INTRAVENOUS; SUBCUTANEOUS EVERY 5 MIN PRN
Status: DISCONTINUED | OUTPATIENT
Start: 2024-09-14 | End: 2024-09-20 | Stop reason: HOSPADM

## 2024-09-14 RX ORDER — LIDOCAINE 560 MG/1
2 PATCH PERCUTANEOUS; TOPICAL; TRANSDERMAL DAILY
Status: DISCONTINUED | OUTPATIENT
Start: 2024-09-14 | End: 2024-09-20 | Stop reason: HOSPADM

## 2024-09-14 RX ORDER — OXYCODONE HYDROCHLORIDE 5 MG/1
5 TABLET ORAL EVERY 4 HOURS PRN
Status: DISCONTINUED | OUTPATIENT
Start: 2024-09-14 | End: 2024-09-20 | Stop reason: HOSPADM

## 2024-09-14 SDOH — SOCIAL STABILITY: SOCIAL INSECURITY: HAS ANYONE EVER THREATENED TO HURT YOUR FAMILY OR YOUR PETS?: NO

## 2024-09-14 SDOH — SOCIAL STABILITY: SOCIAL INSECURITY: ABUSE: ADULT

## 2024-09-14 SDOH — SOCIAL STABILITY: SOCIAL INSECURITY: HAVE YOU HAD THOUGHTS OF HARMING ANYONE ELSE?: NO

## 2024-09-14 SDOH — SOCIAL STABILITY: SOCIAL INSECURITY: DOES ANYONE TRY TO KEEP YOU FROM HAVING/CONTACTING OTHER FRIENDS OR DOING THINGS OUTSIDE YOUR HOME?: NO

## 2024-09-14 SDOH — SOCIAL STABILITY: SOCIAL INSECURITY: ARE YOU OR HAVE YOU BEEN THREATENED OR ABUSED PHYSICALLY, EMOTIONALLY, OR SEXUALLY BY ANYONE?: NO

## 2024-09-14 SDOH — SOCIAL STABILITY: SOCIAL INSECURITY: DO YOU FEEL UNSAFE GOING BACK TO THE PLACE WHERE YOU ARE LIVING?: NO

## 2024-09-14 SDOH — SOCIAL STABILITY: SOCIAL INSECURITY: DO YOU FEEL ANYONE HAS EXPLOITED OR TAKEN ADVANTAGE OF YOU FINANCIALLY OR OF YOUR PERSONAL PROPERTY?: NO

## 2024-09-14 SDOH — SOCIAL STABILITY: SOCIAL INSECURITY: ARE THERE ANY APPARENT SIGNS OF INJURIES/BEHAVIORS THAT COULD BE RELATED TO ABUSE/NEGLECT?: NO

## 2024-09-14 SDOH — SOCIAL STABILITY: SOCIAL INSECURITY: WERE YOU ABLE TO COMPLETE ALL THE BEHAVIORAL HEALTH SCREENINGS?: YES

## 2024-09-14 SDOH — SOCIAL STABILITY: SOCIAL INSECURITY: HAVE YOU HAD ANY THOUGHTS OF HARMING ANYONE ELSE?: NO

## 2024-09-14 ASSESSMENT — LIFESTYLE VARIABLES
HOW OFTEN DO YOU HAVE 6 OR MORE DRINKS ON ONE OCCASION: NEVER
HOW OFTEN DO YOU HAVE A DRINK CONTAINING ALCOHOL: NEVER
PRESCIPTION_ABUSE_PAST_12_MONTHS: NO
SUBSTANCE_ABUSE_PAST_12_MONTHS: NO
AUDIT-C TOTAL SCORE: 0
SKIP TO QUESTIONS 9-10: 1
HOW MANY STANDARD DRINKS CONTAINING ALCOHOL DO YOU HAVE ON A TYPICAL DAY: PATIENT DOES NOT DRINK
AUDIT-C TOTAL SCORE: 0

## 2024-09-14 ASSESSMENT — COGNITIVE AND FUNCTIONAL STATUS - GENERAL
DRESSING REGULAR UPPER BODY CLOTHING: A LITTLE
DAILY ACTIVITIY SCORE: 16
TURNING FROM BACK TO SIDE WHILE IN FLAT BAD: A LITTLE
WALKING IN HOSPITAL ROOM: A LOT
MOVING FROM LYING ON BACK TO SITTING ON SIDE OF FLAT BED WITH BEDRAILS: A LITTLE
PATIENT BASELINE BEDBOUND: NO
MOBILITY SCORE: 14
STANDING UP FROM CHAIR USING ARMS: A LOT
CLIMB 3 TO 5 STEPS WITH RAILING: A LOT
DRESSING REGULAR LOWER BODY CLOTHING: A LOT
HELP NEEDED FOR BATHING: A LOT
PERSONAL GROOMING: A LITTLE
MOVING TO AND FROM BED TO CHAIR: A LOT
TOILETING: A LOT

## 2024-09-14 ASSESSMENT — PAIN SCALES - PAIN ASSESSMENT IN ADVANCED DEMENTIA (PAINAD): TOTALSCORE: MEDICATION (SEE MAR)

## 2024-09-14 ASSESSMENT — ACTIVITIES OF DAILY LIVING (ADL)
LACK_OF_TRANSPORTATION: NO
HEARING - RIGHT EAR: FUNCTIONAL
WALKS IN HOME: NEEDS ASSISTANCE
FEEDING YOURSELF: INDEPENDENT
PATIENT'S MEMORY ADEQUATE TO SAFELY COMPLETE DAILY ACTIVITIES?: YES
JUDGMENT_ADEQUATE_SAFELY_COMPLETE_DAILY_ACTIVITIES: YES
GROOMING: NEEDS ASSISTANCE
HEARING - LEFT EAR: FUNCTIONAL
TOILETING: NEEDS ASSISTANCE
BATHING: NEEDS ASSISTANCE
ADEQUATE_TO_COMPLETE_ADL: YES
DRESSING YOURSELF: NEEDS ASSISTANCE

## 2024-09-14 ASSESSMENT — PAIN DESCRIPTION - LOCATION: LOCATION: BACK

## 2024-09-14 ASSESSMENT — PATIENT HEALTH QUESTIONNAIRE - PHQ9
2. FEELING DOWN, DEPRESSED OR HOPELESS: NOT AT ALL
1. LITTLE INTEREST OR PLEASURE IN DOING THINGS: NOT AT ALL
SUM OF ALL RESPONSES TO PHQ9 QUESTIONS 1 & 2: 0

## 2024-09-14 ASSESSMENT — COLUMBIA-SUICIDE SEVERITY RATING SCALE - C-SSRS
6. HAVE YOU EVER DONE ANYTHING, STARTED TO DO ANYTHING, OR PREPARED TO DO ANYTHING TO END YOUR LIFE?: NO
2. HAVE YOU ACTUALLY HAD ANY THOUGHTS OF KILLING YOURSELF?: NO
1. IN THE PAST MONTH, HAVE YOU WISHED YOU WERE DEAD OR WISHED YOU COULD GO TO SLEEP AND NOT WAKE UP?: NO

## 2024-09-14 ASSESSMENT — PAIN SCALES - GENERAL
PAINLEVEL_OUTOF10: 6
PAINLEVEL_OUTOF10: 2
PAINLEVEL_OUTOF10: 8
PAINLEVEL_OUTOF10: 5 - MODERATE PAIN

## 2024-09-14 ASSESSMENT — PAIN DESCRIPTION - ORIENTATION: ORIENTATION: MID

## 2024-09-14 ASSESSMENT — PAIN - FUNCTIONAL ASSESSMENT
PAIN_FUNCTIONAL_ASSESSMENT: 0-10
PAIN_FUNCTIONAL_ASSESSMENT: 0-10

## 2024-09-14 NOTE — ED PROVIDER NOTES
Patient endorsed to me by Dr. Joaquin follow-up MRI results and disposition.    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    I discussed finding with the patient at bedside.  I contacted the neurosurgeon on-call discussed the case with him.  He did not feel was an emergent surgical issue but did feel that if she had significant issues ambulating worsening symptoms she should be transferred to [facility] for surgical management.  I do feel given her progressive history, urinary bowel incontinence and worsening pain and inability to bear weight on her left leg coupled with the MRI findings she requires transfer.  He accepted transfer of this patient to their facility.     Cody Anna,   09/14/24 0120

## 2024-09-14 NOTE — ED PROVIDER NOTES
Supervisory note:  Patient seen in conjunction with JEY Vargas.    Patient presents with pain in her back radiating to her legs.  She had 2 previous injuries in which she fell on her right hip several days apart.  Since then, she has been having pain in the low back, with shooting sensations down the legs, worse on the left.  The pain has been making it difficult for her to get around.  She reports intermittent numbness in the left leg as well.  No loss of control of bowels or bladder.  No fevers.  Patient is able to move legs bilaterally, however movement is limited secondary to pain.  Sensation is intact bilaterally.    MRI results pending at this time.  Patient signed out to Dr. Anna pending MRI results.    I personally saw the patient and made/approved the management plan and take responsiblity for the patient management.  Parts of this chart were completed with dictation software, please excuse any errors in transcription.     Jordin Joaquin MD  09/13/24 8713

## 2024-09-14 NOTE — CARE PLAN
The patient's goals for the shift include      The clinical goals for the shift include pt will remain safe and free from falls/injury    Problem: Fall/Injury  Goal: Not fall by end of shift  Outcome: Met  Goal: Be free from injury by end of the shift  Outcome: Met     Problem: Pain  Goal: Turns in bed with improved pain control throughout the shift  Outcome: Progressing  Goal: Walks with improved pain control throughout the shift  Outcome: Progressing  Goal: Performs ADL's with improved pain control throughout shift  Outcome: Progressing

## 2024-09-14 NOTE — ED PROVIDER NOTES
HPI   Chief Complaint   Patient presents with    Back Pain     Spinal and left hip and leg pain , previous injury from fall, has been getting physical therapy for it. Cannot take pain today. Also states has been smelling mold and has sinus headache       Patient is a 44-year-old female with past medical history of remote hip and back injury presenting with back pain, intermittent pelvic and left leg numbness, and congestion.  She states that she has been in the emergency department on several occasions for the pain.  She has been x-rayed previously.  She is currently in physical therapy for this pain.  She states that the pain became too much to bear today so she presents to the ED.  She states she is unable to ambulate due to pain.  States she is having intermittent pelvic numbness that she states she has had in the past.  Endorses left-sided sciatica-like pain.  Feels like her left side on her lower extremity is extremely weak.  Denies fevers, chills, illicit drug use, anticoagulant use.  Denies recent trauma to the area.  Does endorse nasal congestion and sinus headaches.  Patient denies fevers, chills, cough, sore throat, chest pain, shortness of breath, abdominal pain, nausea, vomiting, diarrhea or urinary complaints.              Patient History   No past medical history on file.  No past surgical history on file.  No family history on file.  Social History     Tobacco Use    Smoking status: Never    Smokeless tobacco: Never   Vaping Use    Vaping status: Never Used   Substance Use Topics    Alcohol use: Never    Drug use: Never       Physical Exam   ED Triage Vitals   Temperature Heart Rate Respirations BP   09/13/24 1839 09/13/24 1839 09/13/24 1845 09/13/24 1839   37 °C (98.6 °F) (!) 118 16 (!) 137/107      Pulse Ox Temp Source Heart Rate Source Patient Position   09/13/24 1839 09/13/24 1839 09/13/24 1839 09/13/24 1839   94 % Oral Monitor Sitting      BP Location FiO2 (%)     09/13/24 1839 --     Right arm         Physical Exam  Vitals and nursing note reviewed.   Constitutional:       Appearance: She is well-developed.      Comments: Awake, laying in examination chair   HENT:      Head: Normocephalic and atraumatic.      Nose: Nose normal.      Mouth/Throat:      Mouth: Mucous membranes are moist.      Pharynx: Oropharynx is clear.   Eyes:      Extraocular Movements: Extraocular movements intact.      Conjunctiva/sclera: Conjunctivae normal.      Pupils: Pupils are equal, round, and reactive to light.   Cardiovascular:      Rate and Rhythm: Regular rhythm. Tachycardia present.      Pulses: Normal pulses.      Heart sounds: Normal heart sounds. No murmur heard.  Pulmonary:      Effort: Pulmonary effort is normal. No respiratory distress.      Breath sounds: Normal breath sounds.   Abdominal:      General: Abdomen is flat.      Palpations: Abdomen is soft.      Tenderness: There is no abdominal tenderness.   Musculoskeletal:         General: No swelling.      Cervical back: Normal range of motion and neck supple.   Skin:     General: Skin is warm and dry.      Capillary Refill: Capillary refill takes less than 2 seconds.   Neurological:      General: No focal deficit present.      Mental Status: She is alert and oriented to person, place, and time.      Comments: Awake, alert and oriented x 3. Power intact in the upper and lower extremities on the right.  Power intact in the upper extremity on the left.  Strength decreased on the left lower extremity, 3 out of 5.  Sensation is intact to light touch in the upper and lower extremities. Cranial Nerves 2-12 are intact. Finger-to-nose intact. No truncal ataxia.  Unable to perform reflexes due to patient being unable to be seated related to pain.   Psychiatric:         Mood and Affect: Mood normal.         Behavior: Behavior normal.           ED Course & MDM   ED Course as of 09/13/24 2046   Fri Sep 13, 2024   2007 Patient's pain minimally improved with medications.  Gabapentin  ordered. [AR]      ED Course User Index  [AR] Mauro Howe PA-C         Diagnoses as of 09/13/24 2046   Chronic bilateral low back pain with left-sided sciatica                 No data recorded     Ellen Coma Scale Score: 15 (09/13/24 1913 : Rachelle Patterson, CROW)                           Medical Decision Making  Patient is a 44-year-old female with past medical history of remote hip and back injury presenting with back pain, intermittent pelvic and left leg numbness, and congestion.  UA ordered.  MRIs ordered.  Conditions considered include but are not limited to: Spinal cord compression, cauda equina.  Prednisone, oxycodone, lidocaine patch ordered.    I saw this patient in conjunction with Dr. Joaquin.  UA with micro is pending.  Imaging is currently pending.  Patient still having pain so gabapentin ordered.    Time of my departure.  Please refer to attending physician note for further evaluation, treatment and final disposition.    Portions of this note made with Dragon software, please be mindful of potential grammatical errors.        Medications   lidocaine 4 % patch 1 patch (1 patch transdermal Medication Applied 9/13/24 1924)   gabapentin (Neurontin) capsule 100 mg (has no administration in time range)   oxyCODONE (Roxicodone) immediate release tablet 5 mg (5 mg oral Given 9/13/24 1923)   acetaminophen (Tylenol) tablet 975 mg (975 mg oral Given 9/13/24 1922)   predniSONE (Deltasone) tablet 60 mg (60 mg oral Given 9/13/24 1922)         Labs Reviewed   URINALYSIS WITH REFLEX CULTURE AND MICROSCOPIC    Narrative:     The following orders were created for panel order Urinalysis with Reflex Culture and Microscopic.  Procedure                               Abnormality         Status                     ---------                               -----------         ------                     Urinalysis with Reflex C...[[id number]]                                                 Extra Urine Gray Tube[[id number]]                                                          Please view results for these tests on the individual orders.   URINALYSIS WITH REFLEX CULTURE AND MICROSCOPIC   EXTRA URINE GRAY TUBE         MR thoracic spine wo IV contrast    (Results Pending)   MR lumbar spine wo IV contrast    (Results Pending)         Procedure  Procedures     Mauro Howe PA-C  09/13/24 2045       Mauro Howe PA-C  09/13/24 2046

## 2024-09-14 NOTE — PROGRESS NOTES
Physical Therapy                 Therapy Communication Note    Patient Name: Lynnette Baig  MRN: [id number]  Department: [facility] [address]  Room: [address] [place] [address]  Today's Date: 9/14/2024     Discipline: Physical Therapy    Missed Visit Reason: Missed Visit Reason:  (Pending OR for intradural tumor resection with NSGY. Will follow up for PT evaluation post op.)    Missed Time: Attempt    Comment:

## 2024-09-14 NOTE — H&P
History Of Present Illness  Lynnette Baig is a 44 y.o. female with h/o HTN, vit D deficiency, p/w LBP w LLE radiculopathy, MRI T/L 3.7 cm L4-L5 intradural lesion    Patient specifically endorses:  Back pain, LLE radiculopathy.    Has previously attempted pain control with the following:  NSAIDs or Muscle relaxants     Patient's imaging was personally reviewed and notable for MRI T/L 3.7 cm L4-L5 intradural lesion    Past Medical History  She has no past medical history on file.    Surgical History  She has no past surgical history on file.     Social History  She reports that she has never smoked. She has never used smokeless tobacco. She reports that she does not drink alcohol and does not use drugs.     Allergies  Latex    Medications  Medications Prior to Admission   Medication Sig Dispense Refill Last Dose    cyclobenzaprine (Flexeril) 10 mg tablet Take 1 tablet (10 mg) by mouth 3 times a day as needed for muscle spasms (Pain). 20 tablet 0     cyclobenzaprine (Flexeril) 10 mg tablet Take 1 tablet (10 mg) by mouth 2 times a day as needed for muscle spasms for up to 10 days. 20 tablet 0     lidocaine (Lidoderm) 5 % patch Place 1 patch over 12 hours on the skin once daily. 15 patch 0     lisinopriL-hydrochlorothiazide 20-25 mg tablet Take 1 tablet by mouth once daily at bedtime.       oxyCODONE (Oxy-IR) 5 mg immediate release capsule Take 1 capsule (5 mg) by mouth every 4 hours if needed for severe pain (7 - 10).          Review of Systems   Review of systems was reviewed and otherwise negative other than what was listed in the HPI.    Neurological Exam  Physical Exam  Spine Musculoskeletal Exam    GENERAL APPEARANCE:  No distress, alert, interactive and cooperative.   RESP: breathing comfortably  CARDIOVASCULAR: Regular rate and rhythm. Radial pulses +2 and equal. No swelling, varicosities, edema, or tenderness to palpation.   NEURO:     NAD, A&Ox3     Cranial Nerves II-XII: PERRL, EOMI, Face symmetric, Facial  SILT, Palate/Tongue midline and symmetric, shoulder shrugs symmetric, hearing intact to finger rubs bilaterally       MOTOR:         Muscle bulk and tone were normal in both upper and lower extremities.          RUE D5 / B5 / T5 / HG 5/ IO 5       LUE D5 / B5 / T5 / HG 5/ IO 5       Negative juarez         RLE HF5 / KE 5/ DF 5/ PF 5       LLE HF5 / KE 5/ DF 5/ PF 5       No clonus      SENSORY:  LLE radiculopathy       COORDINATION:        BUE, finger-nose-finger was intact without dysmetria or overshoot.       BLE, heel-to-shin was intact.      PSYCH: appropriate  SKIN: no obvious lesions    Last Recorded Vitals  Blood pressure (!) 167/112, pulse 105, temperature 36.5 °C (97.7 °F), temperature source Temporal, resp. rate 20, height 1.524 m (5'), weight 87.8 kg (193 lb 9 oz), SpO2 98%.    Relevant Results  Results for orders placed or performed during the hospital encounter of 09/13/24 (from the past 24 hour(s))   CBC and Auto Differential   Result Value Ref Range    WBC 15.3 (H) 4.4 - 11.3 x10*3/uL    nRBC 0.0 0.0 - 0.0 /100 WBCs    RBC 4.45 4.00 - 5.20 x10*6/uL    Hemoglobin 11.8 (L) 12.0 - 16.0 g/dL    Hematocrit 37.1 36.0 - 46.0 %    MCV 83 80 - 100 fL    MCH 26.5 26.0 - 34.0 pg    MCHC 31.8 (L) 32.0 - 36.0 g/dL    RDW 13.8 11.5 - 14.5 %    Platelets 363 150 - 450 x10*3/uL    Neutrophils % 89.1 40.0 - 80.0 %    Immature Granulocytes %, Automated 0.3 0.0 - 0.9 %    Lymphocytes % 7.1 13.0 - 44.0 %    Monocytes % 2.2 2.0 - 10.0 %    Eosinophils % 0.9 0.0 - 6.0 %    Basophils % 0.4 0.0 - 2.0 %    Neutrophils Absolute 13.61 (H) 1.20 - 7.70 x10*3/uL    Immature Granulocytes Absolute, Automated 0.05 0.00 - 0.70 x10*3/uL    Lymphocytes Absolute 1.08 (L) 1.20 - 4.80 x10*3/uL    Monocytes Absolute 0.34 0.10 - 1.00 x10*3/uL    Eosinophils Absolute 0.13 0.00 - 0.70 x10*3/uL    Basophils Absolute 0.06 0.00 - 0.10 x10*3/uL   Comprehensive Metabolic Panel   Result Value Ref Range    Glucose 114 (H) 65 - 99 mg/dL    Sodium 136  133 - 145 mmol/L    Potassium 3.4 3.4 - 5.1 mmol/L    Chloride 99 97 - 107 mmol/L    Bicarbonate 26 24 - 31 mmol/L    Urea Nitrogen 15 8 - 25 mg/dL    Creatinine 0.70 0.40 - 1.60 mg/dL    eGFR >90 >60 mL/min/1.73m*2    Calcium 10.1 8.5 - 10.4 mg/dL    Albumin 3.9 3.5 - 5.0 g/dL    Alkaline Phosphatase 70 35 - 125 U/L    Total Protein 6.9 5.9 - 7.9 g/dL    AST 9 5 - 40 U/L    Bilirubin, Total 0.3 0.1 - 1.2 mg/dL    ALT 10 5 - 40 U/L    Anion Gap 11 <=19 mmol/L   Sedimentation rate, automated   Result Value Ref Range    Sedimentation Rate 36 (H) 0 - 20 mm/h   C-reactive protein   Result Value Ref Range    C-Reactive Protein 4.60 (H) 0.00 - 2.00 mg/dL   Urinalysis with Reflex Culture and Microscopic   Result Value Ref Range    Color, Urine Yellow Light-Yellow, Yellow, Dark-Yellow    Appearance, Urine Clear Clear    Specific Gravity, Urine 1.023 1.005 - 1.035    pH, Urine 6.0 5.0, 5.5, 6.0, 6.5, 7.0, 7.5, 8.0    Protein, Urine 10 (TRACE) NEGATIVE, 10 (TRACE), 20 (TRACE) mg/dL    Glucose, Urine Normal Normal mg/dL    Blood, Urine 0.2 (2+) (A) NEGATIVE    Ketones, Urine NEGATIVE NEGATIVE mg/dL    Bilirubin, Urine NEGATIVE NEGATIVE    Urobilinogen, Urine Normal Normal mg/dL    Nitrite, Urine NEGATIVE NEGATIVE    Leukocyte Esterase, Urine NEGATIVE NEGATIVE   Urinalysis Microscopic   Result Value Ref Range    WBC, Urine 1-5 1-5, NONE /HPF    RBC, Urine 1-2 NONE, 1-2, 3-5 /HPF    Squamous Epithelial Cells, Urine 1-9 (SPARSE) Reference range not established. /HPF    Bacteria, Urine 2+ (A) NONE SEEN /HPF    Mucus, Urine FEW Reference range not established. /LPF          SCORES      Assessment/Plan   Assessment & Plan  Intradural extramedullary spinal tumor      Lynnette Baig is a 44 y.o. female with h/o HTN, vit D deficiency, p/w LBP w LLE radiculopathy, MRI T/L 3.7 cm L4-L5 intradural lesion    Symptoms are consistent with:  Lumbar Radiculopathy    Patient is with L4-L5 intradural lesion with severe back pain and lower  extremity radiculopathy.  Will need surgical intervention for resection of the tumor.    Plan  Floor  OR planning Mon 9/16 vs Wed 9/18 for L3-L5 lami for intradural tumor resection  PTOT    Adelita Srinivasan MD  Note authored by resident on neurosurgery team, with all questions or to contact team please page at [phone]

## 2024-09-14 NOTE — PROGRESS NOTES
Occupational Therapy                 Therapy Communication Note    Patient Name: Lynnette Baig  MRN: [id number]  Department: [facility] [place] [address]  Room: [address] [place] [address]A  Today's Date: 9/14/2024     Discipline: Occupational Therapy    Missed Visit Reason: Missed Visit Reason:  (OR planning Mon 9/16 vs Wed 9/18 for L3-L5 lami for intradural tumor resection - will follow post op)    Missed Time: Attempt    Comment:

## 2024-09-14 NOTE — PROGRESS NOTES
Pharmacy Admission Order Reconciliation Review    Lynnette Baig is a 44 y.o. female admitted for Intradural extramedullary spinal tumor. Pharmacy reviewed the patient's unreconciled admission medications.    Prior to admission medications that were reviewed and acted on by the pharmacist include:  Acetaminophen  Ibuprofen OTC  These medications have been reconciled.     Any other unreconcilied medications have been addressed and will be ordered or held by the patient's medical team. Medications addressed by the pharmacist may be added or changed by the patient's medical team at any time.    Eagle Melo, PharmD  Transitions of Care Pharmacist  [facility]  Please reach out via Secure Chat for questions

## 2024-09-14 NOTE — PROGRESS NOTES
Pharmacy Medication History Review    Lynnette Baig is a 44 y.o. female admitted for Intradural extramedullary spinal tumor. Pharmacy reviewed the patient's [id number] medications and allergies for accuracy.    Medications ADDED:  Acetaminophen 500m tablets every 8 hours  Ibuprofen 200m tablets every 12 hours  Medications CHANGED:  Oxycodone 5mg tablets  Medications REMOVED:   Cyclobenzaprine- duplicate   Lidocaine 5% patch     The list below reflects the updated PTA list.   Prior to Admission Medications   Prescriptions Last Dose Informant   acetaminophen (Tylenol) 500 mg tablet  Self   Sig: Take 2 tablets (1,000 mg) by mouth every 8 hours.   cyclobenzaprine (Flexeril) 10 mg tablet Not Taking Self   Sig: Take 1 tablet (10 mg) by mouth 3 times a day as needed for muscle spasms (Pain).   Patient not taking: Reported on 2024   cyclobenzaprine (Flexeril) 10 mg tablet     Sig: Take 1 tablet (10 mg) by mouth 2 times a day as needed for muscle spasms for up to 10 days.   ibuprofen 200 mg tablet  Self   Sig: Take 2 tablets (400 mg) by mouth every 12 hours.   lidocaine (Lidoderm) 5 % patch Not Taking Self   Sig: Place 1 patch over 12 hours on the skin once daily.   Patient not taking: Reported on 2024   lisinopriL-hydrochlorothiazide 20-25 mg tablet  Self   Sig: Take 1 tablet by mouth once daily at bedtime.   oxyCODONE (Roxicodone) 5 mg immediate release tablet  Self   Sig: Take 1 tablet (5 mg) by mouth every 4 hours if needed for severe pain (7 - 10).      Facility-Administered Medications: None       The list below reflects the updated allergy list. Please review each documented allergy for additional clarification and justification.  Allergies  Reviewed by Stacey Ordonez RN on 2024        Severity Reactions Comments    Latex Not Specified Hives             Patient accepts M2B at discharge. Pharmacy has been updated to [facility].    Sources  Review of out patient fill history,  OARRS, and patient interview   Reliable historian     Additional Comments  N/A    Eagle Melo PharmD  Transitions of Care Pharmacist  [facility]  Please reach out via Secure Chat for questions, or if no response call [place] or [facility]era [facility]

## 2024-09-15 PROCEDURE — 2500000001 HC RX 250 WO HCPCS SELF ADMINISTERED DRUGS (ALT 637 FOR MEDICARE OP)

## 2024-09-15 PROCEDURE — 1100000001 HC PRIVATE ROOM DAILY

## 2024-09-15 PROCEDURE — 2500000004 HC RX 250 GENERAL PHARMACY W/ HCPCS (ALT 636 FOR OP/ED)

## 2024-09-15 PROCEDURE — 2500000005 HC RX 250 GENERAL PHARMACY W/O HCPCS

## 2024-09-15 RX ORDER — DEXAMETHASONE 2 MG/1
2 TABLET ORAL EVERY 8 HOURS
Status: DISCONTINUED | OUTPATIENT
Start: 2024-09-15 | End: 2024-09-16

## 2024-09-15 RX ORDER — SODIUM CHLORIDE 9 MG/ML
75 INJECTION, SOLUTION INTRAVENOUS CONTINUOUS
Status: DISCONTINUED | OUTPATIENT
Start: 2024-09-16 | End: 2024-09-19

## 2024-09-15 ASSESSMENT — PAIN DESCRIPTION - ORIENTATION
ORIENTATION: POSTERIOR
ORIENTATION: MID

## 2024-09-15 ASSESSMENT — PAIN SCALES - GENERAL
PAINLEVEL_OUTOF10: 6
PAINLEVEL_OUTOF10: 4
PAINLEVEL_OUTOF10: 5 - MODERATE PAIN
PAINLEVEL_OUTOF10: 8
PAINLEVEL_OUTOF10: 5 - MODERATE PAIN

## 2024-09-15 ASSESSMENT — PAIN DESCRIPTION - LOCATION
LOCATION: BACK

## 2024-09-15 NOTE — NURSING NOTE
TCC admission assessment completed. Explained role of TCC - verbalized understanding.      Demographics/Insurance: Address, phone number, and insurance verified in chart.  Living Environment: Lives in single family home  Primary Support Person:  Brant [phone]  PCP: Pamela   Recent Falls: Denies  Assistive Devices/DME: uses cane and walker   Home Oxygen: none   Dialysis: none   Home Care Agency: n/a   Transportation at Discharge:  will transport   Pharmacy: [facility]   Concerns about discharge: None at this time  Patient admission completed, patient denies need any help at this time. Will continue to follow for home going needs. Rupa Awan RN TCC

## 2024-09-15 NOTE — PROGRESS NOTES
Lynnette Baig is a 44 y.o. female on day 1 of admission presenting with Intradural extramedullary spinal tumor.    Subjective   No acute events overnight, pt with back pain and LLE radicular pain       Objective     Physical Exam  NEURO:     NAD, A&Ox3     Cranial Nerves II-XII: PERRL, EOMI, Face symmetric, Facial SILT, Palate/Tongue midline and symmetric, shoulder shrugs symmetric, hearing intact to finger rubs bilaterally        MOTOR:         Muscle bulk and tone were normal in both upper and lower extremities.           RUE D5 / B5 / T5 / HG 5/ IO 5       LUE D5 / B5 / T5 / HG 5/ IO 5       Negative juarez          RLE HF5 / KE 5/ DF 5/ PF 5       LLE HF5 / KE 5/ DF 5/ PF 5       No clonus       SENSORY:  LLE radiculopathy        COORDINATION:        BUE, finger-nose-finger was intact without dysmetria or overshoot.       BLE, heel-to-shin was intact.       PSYCH: appropriate  SKIN: no obvious lesions      Last Recorded Vitals  Blood pressure (!) 158/111, pulse 102, temperature 36.9 °C (98.4 °F), temperature source Temporal, resp. rate 18, height 1.524 m (5'), weight 87.8 kg (193 lb 9 oz), SpO2 99%.  Intake/Output last 3 Shifts:  I/O last 3 completed shifts:  In: - (0 mL/kg)   Out: 600 (6.8 mL/kg) [Urine:600 (0.2 mL/kg/hr)]  Weight: 87.8 kg     Relevant Results                              Assessment/Plan   Assessment & Plan  Intradural extramedullary spinal tumor    Lynnette Baig is a 44 y.o. female with h/o HTN, vit D deficiency, p/w LBP w LLE radiculopathy, MRI T/L 3.7 cm L4-L5 intradural lesion     Symptoms are consistent with:  Lumbar Radiculopathy     Patient is with L4-L5 intradural lesion with severe back pain and lower extremity radiculopathy.  Will need surgical intervention for resection of the tumor.     Plan  Floor  OR planning Mon 9/16 vs Wed 9/18 for L3-L5 lami for intradural tumor resection  PTOT           Endy Alcantar MD

## 2024-09-16 ENCOUNTER — APPOINTMENT (OUTPATIENT)
Dept: RADIOLOGY | Facility: HOSPITAL | Age: 44
DRG: 519 | End: 2024-09-16
Payer: COMMERCIAL

## 2024-09-16 ENCOUNTER — ANESTHESIA (OUTPATIENT)
Dept: OPERATING ROOM | Facility: HOSPITAL | Age: 44
End: 2024-09-16
Payer: COMMERCIAL

## 2024-09-16 ENCOUNTER — HOSPITAL ENCOUNTER (INPATIENT)
Dept: NEUROLOGY | Facility: HOSPITAL | Age: 44
Discharge: HOME | DRG: 519 | End: 2024-09-16
Payer: COMMERCIAL

## 2024-09-16 ENCOUNTER — ANESTHESIA EVENT (OUTPATIENT)
Dept: OPERATING ROOM | Facility: HOSPITAL | Age: 44
End: 2024-09-16
Payer: COMMERCIAL

## 2024-09-16 PROBLEM — I10 HTN (HYPERTENSION): Status: ACTIVE | Noted: 2024-09-16

## 2024-09-16 PROBLEM — R09.89 SINUS SYMPTOM: Status: ACTIVE | Noted: 2024-09-16

## 2024-09-16 LAB
ABO GROUP (TYPE) IN BLOOD: NORMAL
ANION GAP BLDA CALCULATED.4IONS-SCNC: 11 MMO/L (ref 10–25)
ANION GAP BLDA CALCULATED.4IONS-SCNC: 12 MMO/L (ref 10–25)
BASE EXCESS BLDA CALC-SCNC: -0.6 MMOL/L (ref -2–3)
BASE EXCESS BLDA CALC-SCNC: -0.8 MMOL/L (ref -2–3)
BODY TEMPERATURE: 37 DEGREES CELSIUS
BODY TEMPERATURE: 37 DEGREES CELSIUS
CA-I BLDA-SCNC: 1.18 MMOL/L (ref 1.1–1.33)
CA-I BLDA-SCNC: 1.26 MMOL/L (ref 1.1–1.33)
CHLORIDE BLDA-SCNC: 101 MMOL/L (ref 98–107)
CHLORIDE BLDA-SCNC: 101 MMOL/L (ref 98–107)
GLUCOSE BLDA-MCNC: 136 MG/DL (ref 74–99)
GLUCOSE BLDA-MCNC: 149 MG/DL (ref 74–99)
HCO3 BLDA-SCNC: 22.9 MMOL/L (ref 22–26)
HCO3 BLDA-SCNC: 23.2 MMOL/L (ref 22–26)
HCT VFR BLD EST: 32 % (ref 36–46)
HCT VFR BLD EST: 33 % (ref 36–46)
HGB BLDA-MCNC: 10.8 G/DL (ref 12–16)
HGB BLDA-MCNC: 11 G/DL (ref 12–16)
INHALED O2 CONCENTRATION: 35 %
INHALED O2 CONCENTRATION: 40 %
LACTATE BLDA-SCNC: 0.8 MMOL/L (ref 0.4–2)
LACTATE BLDA-SCNC: 0.9 MMOL/L (ref 0.4–2)
OXYHGB MFR BLDA: 97.5 % (ref 94–98)
OXYHGB MFR BLDA: 97.7 % (ref 94–98)
PCO2 BLDA: 33 MM HG (ref 38–42)
PCO2 BLDA: 35 MM HG (ref 38–42)
PH BLDA: 7.43 PH (ref 7.38–7.42)
PH BLDA: 7.45 PH (ref 7.38–7.42)
PO2 BLDA: 179 MM HG (ref 85–95)
PO2 BLDA: 188 MM HG (ref 85–95)
POTASSIUM BLDA-SCNC: 3.2 MMOL/L (ref 3.5–5.3)
POTASSIUM BLDA-SCNC: 3.4 MMOL/L (ref 3.5–5.3)
RH FACTOR (ANTIGEN D): NORMAL
SAO2 % BLDA: 100 % (ref 94–100)
SAO2 % BLDA: 100 % (ref 94–100)
SODIUM BLDA-SCNC: 132 MMOL/L (ref 136–145)
SODIUM BLDA-SCNC: 132 MMOL/L (ref 136–145)

## 2024-09-16 PROCEDURE — 2500000005 HC RX 250 GENERAL PHARMACY W/O HCPCS

## 2024-09-16 PROCEDURE — 3600000004 HC OR TIME - INITIAL BASE CHARGE - PROCEDURE LEVEL FOUR: Performed by: STUDENT IN AN ORGANIZED HEALTH CARE EDUCATION/TRAINING PROGRAM

## 2024-09-16 PROCEDURE — 2500000004 HC RX 250 GENERAL PHARMACY W/ HCPCS (ALT 636 FOR OP/ED)

## 2024-09-16 PROCEDURE — 2500000004 HC RX 250 GENERAL PHARMACY W/ HCPCS (ALT 636 FOR OP/ED): Performed by: STUDENT IN AN ORGANIZED HEALTH CARE EDUCATION/TRAINING PROGRAM

## 2024-09-16 PROCEDURE — 88333 PATH CONSLTJ SURG CYTO XM 1: CPT | Performed by: PATHOLOGY

## 2024-09-16 PROCEDURE — 74018 RADEX ABDOMEN 1 VIEW: CPT | Performed by: RADIOLOGY

## 2024-09-16 PROCEDURE — 88342 IMHCHEM/IMCYTCHM 1ST ANTB: CPT | Performed by: PATHOLOGY

## 2024-09-16 PROCEDURE — 88341 IMHCHEM/IMCYTCHM EA ADD ANTB: CPT | Performed by: PATHOLOGY

## 2024-09-16 PROCEDURE — C9248 INJ, CLEVIDIPINE BUTYRATE: HCPCS | Performed by: STUDENT IN AN ORGANIZED HEALTH CARE EDUCATION/TRAINING PROGRAM

## 2024-09-16 PROCEDURE — 88307 TISSUE EXAM BY PATHOLOGIST: CPT | Performed by: PATHOLOGY

## 2024-09-16 PROCEDURE — 3700000001 HC GENERAL ANESTHESIA TIME - INITIAL BASE CHARGE: Performed by: STUDENT IN AN ORGANIZED HEALTH CARE EDUCATION/TRAINING PROGRAM

## 2024-09-16 PROCEDURE — 2500000002 HC RX 250 W HCPCS SELF ADMINISTERED DRUGS (ALT 637 FOR MEDICARE OP, ALT 636 FOR OP/ED): Performed by: PHYSICIAN ASSISTANT

## 2024-09-16 PROCEDURE — 2720000007 HC OR 272 NO HCPCS: Performed by: STUDENT IN AN ORGANIZED HEALTH CARE EDUCATION/TRAINING PROGRAM

## 2024-09-16 PROCEDURE — 84132 ASSAY OF SERUM POTASSIUM: CPT | Performed by: STUDENT IN AN ORGANIZED HEALTH CARE EDUCATION/TRAINING PROGRAM

## 2024-09-16 PROCEDURE — 2780000003 HC OR 278 NO HCPCS: Performed by: STUDENT IN AN ORGANIZED HEALTH CARE EDUCATION/TRAINING PROGRAM

## 2024-09-16 PROCEDURE — 37799 UNLISTED PX VASCULAR SURGERY: CPT

## 2024-09-16 PROCEDURE — 63282 BX/EXC IDRL SPINE LESN LMBR: CPT | Performed by: STUDENT IN AN ORGANIZED HEALTH CARE EDUCATION/TRAINING PROGRAM

## 2024-09-16 PROCEDURE — 7100000001 HC RECOVERY ROOM TIME - INITIAL BASE CHARGE: Performed by: STUDENT IN AN ORGANIZED HEALTH CARE EDUCATION/TRAINING PROGRAM

## 2024-09-16 PROCEDURE — 69990 MICROSURGERY ADD-ON: CPT | Performed by: STUDENT IN AN ORGANIZED HEALTH CARE EDUCATION/TRAINING PROGRAM

## 2024-09-16 PROCEDURE — 00BY0ZZ EXCISION OF LUMBAR SPINAL CORD, OPEN APPROACH: ICD-10-PCS | Performed by: STUDENT IN AN ORGANIZED HEALTH CARE EDUCATION/TRAINING PROGRAM

## 2024-09-16 PROCEDURE — 88307 TISSUE EXAM BY PATHOLOGIST: CPT | Mod: TC,SUR | Performed by: NEUROLOGICAL SURGERY

## 2024-09-16 PROCEDURE — 2500000001 HC RX 250 WO HCPCS SELF ADMINISTERED DRUGS (ALT 637 FOR MEDICARE OP)

## 2024-09-16 PROCEDURE — 88331 PATH CONSLTJ SURG 1 BLK 1SPC: CPT | Performed by: PATHOLOGY

## 2024-09-16 PROCEDURE — 3600000009 HC OR TIME - EACH INCREMENTAL 1 MINUTE - PROCEDURE LEVEL FOUR: Performed by: STUDENT IN AN ORGANIZED HEALTH CARE EDUCATION/TRAINING PROGRAM

## 2024-09-16 PROCEDURE — 95940 IONM IN OPERATNG ROOM 15 MIN: CPT

## 2024-09-16 PROCEDURE — 2500000005 HC RX 250 GENERAL PHARMACY W/O HCPCS: Performed by: STUDENT IN AN ORGANIZED HEALTH CARE EDUCATION/TRAINING PROGRAM

## 2024-09-16 PROCEDURE — 2500000001 HC RX 250 WO HCPCS SELF ADMINISTERED DRUGS (ALT 637 FOR MEDICARE OP): Performed by: STUDENT IN AN ORGANIZED HEALTH CARE EDUCATION/TRAINING PROGRAM

## 2024-09-16 PROCEDURE — 1100000001 HC PRIVATE ROOM DAILY

## 2024-09-16 PROCEDURE — 88334 PATH CONSLTJ SURG CYTO XM EA: CPT | Performed by: PATHOLOGY

## 2024-09-16 PROCEDURE — P9045 ALBUMIN (HUMAN), 5%, 250 ML: HCPCS | Mod: JZ | Performed by: STUDENT IN AN ORGANIZED HEALTH CARE EDUCATION/TRAINING PROGRAM

## 2024-09-16 PROCEDURE — A63282 PR BX/EXCIS SPIN TUM,INDUR,XMED,LUMB: Performed by: STUDENT IN AN ORGANIZED HEALTH CARE EDUCATION/TRAINING PROGRAM

## 2024-09-16 PROCEDURE — 2500000004 HC RX 250 GENERAL PHARMACY W/ HCPCS (ALT 636 FOR OP/ED): Performed by: ANESTHESIOLOGY

## 2024-09-16 PROCEDURE — 2500000004 HC RX 250 GENERAL PHARMACY W/ HCPCS (ALT 636 FOR OP/ED): Mod: JZ | Performed by: STUDENT IN AN ORGANIZED HEALTH CARE EDUCATION/TRAINING PROGRAM

## 2024-09-16 PROCEDURE — 74018 RADEX ABDOMEN 1 VIEW: CPT

## 2024-09-16 PROCEDURE — 7100000002 HC RECOVERY ROOM TIME - EACH INCREMENTAL 1 MINUTE: Performed by: STUDENT IN AN ORGANIZED HEALTH CARE EDUCATION/TRAINING PROGRAM

## 2024-09-16 PROCEDURE — 3700000002 HC GENERAL ANESTHESIA TIME - EACH INCREMENTAL 1 MINUTE: Performed by: STUDENT IN AN ORGANIZED HEALTH CARE EDUCATION/TRAINING PROGRAM

## 2024-09-16 RX ORDER — CEFAZOLIN 1 G/1
INJECTION, POWDER, FOR SOLUTION INTRAVENOUS AS NEEDED
Status: DISCONTINUED | OUTPATIENT
Start: 2024-09-16 | End: 2024-09-16

## 2024-09-16 RX ORDER — SODIUM CHLORIDE 0.9 G/100ML
IRRIGANT IRRIGATION AS NEEDED
Status: DISCONTINUED | OUTPATIENT
Start: 2024-09-16 | End: 2024-09-16 | Stop reason: HOSPADM

## 2024-09-16 RX ORDER — KETOROLAC TROMETHAMINE 30 MG/ML
30 INJECTION, SOLUTION INTRAMUSCULAR; INTRAVENOUS EVERY 6 HOURS SCHEDULED
Status: DISPENSED | OUTPATIENT
Start: 2024-09-17 | End: 2024-09-19

## 2024-09-16 RX ORDER — LABETALOL HYDROCHLORIDE 5 MG/ML
INJECTION, SOLUTION INTRAVENOUS AS NEEDED
Status: DISCONTINUED | OUTPATIENT
Start: 2024-09-16 | End: 2024-09-16

## 2024-09-16 RX ORDER — VANCOMYCIN HYDROCHLORIDE 1 G/20ML
INJECTION, POWDER, LYOPHILIZED, FOR SOLUTION INTRAVENOUS AS NEEDED
Status: DISCONTINUED | OUTPATIENT
Start: 2024-09-16 | End: 2024-09-16

## 2024-09-16 RX ORDER — BUPIVACAINE HYDROCHLORIDE 5 MG/ML
INJECTION, SOLUTION EPIDURAL; INTRACAUDAL AS NEEDED
Status: DISCONTINUED | OUTPATIENT
Start: 2024-09-16 | End: 2024-09-16 | Stop reason: HOSPADM

## 2024-09-16 RX ORDER — LABETALOL HYDROCHLORIDE 5 MG/ML
10 INJECTION, SOLUTION INTRAVENOUS EVERY 4 HOURS PRN
Status: DISCONTINUED | OUTPATIENT
Start: 2024-09-16 | End: 2024-09-16 | Stop reason: HOSPADM

## 2024-09-16 RX ORDER — PROPOFOL 10 MG/ML
INJECTION, EMULSION INTRAVENOUS AS NEEDED
Status: DISCONTINUED | OUTPATIENT
Start: 2024-09-16 | End: 2024-09-16

## 2024-09-16 RX ORDER — ALBUMIN HUMAN 50 G/1000ML
SOLUTION INTRAVENOUS AS NEEDED
Status: DISCONTINUED | OUTPATIENT
Start: 2024-09-16 | End: 2024-09-16

## 2024-09-16 RX ORDER — FENTANYL CITRATE 50 UG/ML
INJECTION, SOLUTION INTRAMUSCULAR; INTRAVENOUS AS NEEDED
Status: DISCONTINUED | OUTPATIENT
Start: 2024-09-16 | End: 2024-09-16

## 2024-09-16 RX ORDER — METHOCARBAMOL 100 MG/ML
1000 INJECTION, SOLUTION INTRAMUSCULAR; INTRAVENOUS ONCE
Status: COMPLETED | OUTPATIENT
Start: 2024-09-16 | End: 2024-09-16

## 2024-09-16 RX ORDER — ESMOLOL HYDROCHLORIDE 10 MG/ML
INJECTION INTRAVENOUS AS NEEDED
Status: DISCONTINUED | OUTPATIENT
Start: 2024-09-16 | End: 2024-09-16

## 2024-09-16 RX ORDER — HYDROXYZINE HYDROCHLORIDE 25 MG/1
25 TABLET, FILM COATED ORAL EVERY 4 HOURS PRN
Status: DISCONTINUED | OUTPATIENT
Start: 2024-09-16 | End: 2024-09-20 | Stop reason: HOSPADM

## 2024-09-16 RX ORDER — ALBUTEROL SULFATE 0.83 MG/ML
2.5 SOLUTION RESPIRATORY (INHALATION) ONCE AS NEEDED
Status: DISCONTINUED | OUTPATIENT
Start: 2024-09-16 | End: 2024-09-16 | Stop reason: HOSPADM

## 2024-09-16 RX ORDER — LOPERAMIDE HYDROCHLORIDE 2 MG/1
2 CAPSULE ORAL EVERY 6 HOURS PRN
Status: DISCONTINUED | OUTPATIENT
Start: 2024-09-16 | End: 2024-09-17

## 2024-09-16 RX ORDER — DICYCLOMINE HYDROCHLORIDE 10 MG/1
10 CAPSULE ORAL EVERY 6 HOURS PRN
Status: DISCONTINUED | OUTPATIENT
Start: 2024-09-16 | End: 2024-09-20 | Stop reason: HOSPADM

## 2024-09-16 RX ORDER — ACETAMINOPHEN 325 MG/1
975 TABLET ORAL EVERY 6 HOURS SCHEDULED
Status: DISCONTINUED | OUTPATIENT
Start: 2024-09-17 | End: 2024-09-20 | Stop reason: HOSPADM

## 2024-09-16 RX ORDER — METHADONE IN SOD CHLOR,ISO-OSM 10 MG/ML
SYRINGE (ML) INTRAVENOUS AS NEEDED
Status: DISCONTINUED | OUTPATIENT
Start: 2024-09-16 | End: 2024-09-16

## 2024-09-16 RX ORDER — NITROGLYCERIN 20 MG/100ML
INJECTION INTRAVENOUS CONTINUOUS PRN
Status: DISCONTINUED | OUTPATIENT
Start: 2024-09-16 | End: 2024-09-16

## 2024-09-16 RX ORDER — ACETAMINOPHEN 10 MG/ML
INJECTION, SOLUTION INTRAVENOUS AS NEEDED
Status: DISCONTINUED | OUTPATIENT
Start: 2024-09-16 | End: 2024-09-16

## 2024-09-16 RX ORDER — SODIUM CHLORIDE, SODIUM LACTATE, POTASSIUM CHLORIDE, CALCIUM CHLORIDE 600; 310; 30; 20 MG/100ML; MG/100ML; MG/100ML; MG/100ML
100 INJECTION, SOLUTION INTRAVENOUS CONTINUOUS
Status: DISCONTINUED | OUTPATIENT
Start: 2024-09-16 | End: 2024-09-16 | Stop reason: HOSPADM

## 2024-09-16 RX ORDER — MIDAZOLAM HYDROCHLORIDE 1 MG/ML
INJECTION INTRAMUSCULAR; INTRAVENOUS AS NEEDED
Status: DISCONTINUED | OUTPATIENT
Start: 2024-09-16 | End: 2024-09-16

## 2024-09-16 RX ORDER — ONDANSETRON HYDROCHLORIDE 2 MG/ML
4 INJECTION, SOLUTION INTRAVENOUS ONCE AS NEEDED
Status: DISCONTINUED | OUTPATIENT
Start: 2024-09-16 | End: 2024-09-16 | Stop reason: HOSPADM

## 2024-09-16 RX ORDER — ONDANSETRON HYDROCHLORIDE 2 MG/ML
INJECTION, SOLUTION INTRAVENOUS AS NEEDED
Status: DISCONTINUED | OUTPATIENT
Start: 2024-09-16 | End: 2024-09-16

## 2024-09-16 RX ORDER — DEXMEDETOMIDINE HYDROCHLORIDE 4 UG/ML
INJECTION, SOLUTION INTRAVENOUS CONTINUOUS PRN
Status: DISCONTINUED | OUTPATIENT
Start: 2024-09-16 | End: 2024-09-16

## 2024-09-16 RX ORDER — REMIFENTANIL HYDROCHLORIDE 1 MG/ML
INJECTION, POWDER, LYOPHILIZED, FOR SOLUTION INTRAVENOUS CONTINUOUS PRN
Status: DISCONTINUED | OUTPATIENT
Start: 2024-09-16 | End: 2024-09-16

## 2024-09-16 RX ORDER — HYDROMORPHONE HYDROCHLORIDE 1 MG/ML
INJECTION, SOLUTION INTRAMUSCULAR; INTRAVENOUS; SUBCUTANEOUS AS NEEDED
Status: DISCONTINUED | OUTPATIENT
Start: 2024-09-16 | End: 2024-09-16

## 2024-09-16 RX ORDER — HYDROMORPHONE HYDROCHLORIDE 1 MG/ML
0.2 INJECTION, SOLUTION INTRAMUSCULAR; INTRAVENOUS; SUBCUTANEOUS EVERY 5 MIN PRN
Status: DISCONTINUED | OUTPATIENT
Start: 2024-09-16 | End: 2024-09-16 | Stop reason: HOSPADM

## 2024-09-16 RX ORDER — HYDRALAZINE HYDROCHLORIDE 20 MG/ML
INJECTION INTRAMUSCULAR; INTRAVENOUS AS NEEDED
Status: DISCONTINUED | OUTPATIENT
Start: 2024-09-16 | End: 2024-09-16

## 2024-09-16 RX ORDER — ONDANSETRON 4 MG/1
4 TABLET, FILM COATED ORAL EVERY 6 HOURS PRN
Status: DISCONTINUED | OUTPATIENT
Start: 2024-09-16 | End: 2024-09-20 | Stop reason: HOSPADM

## 2024-09-16 RX ORDER — CLONIDINE HYDROCHLORIDE 0.1 MG/1
0.1 TABLET ORAL EVERY 4 HOURS PRN
Status: DISCONTINUED | OUTPATIENT
Start: 2024-09-16 | End: 2024-09-20 | Stop reason: HOSPADM

## 2024-09-16 RX ORDER — LIDOCAINE HCL/PF 100 MG/5ML
SYRINGE (ML) INTRAVENOUS AS NEEDED
Status: DISCONTINUED | OUTPATIENT
Start: 2024-09-16 | End: 2024-09-16

## 2024-09-16 RX ORDER — TRAZODONE HYDROCHLORIDE 50 MG/1
100 TABLET ORAL NIGHTLY PRN
Status: DISCONTINUED | OUTPATIENT
Start: 2024-09-16 | End: 2024-09-20 | Stop reason: HOSPADM

## 2024-09-16 RX ORDER — LIDOCAINE HYDROCHLORIDE AND EPINEPHRINE 5; 5 MG/ML; UG/ML
INJECTION, SOLUTION INFILTRATION; PERINEURAL AS NEEDED
Status: DISCONTINUED | OUTPATIENT
Start: 2024-09-16 | End: 2024-09-16 | Stop reason: HOSPADM

## 2024-09-16 RX ORDER — KETAMINE HYDROCHLORIDE 100 MG/ML
INJECTION, SOLUTION INTRAMUSCULAR; INTRAVENOUS CONTINUOUS PRN
Status: DISCONTINUED | OUTPATIENT
Start: 2024-09-16 | End: 2024-09-16

## 2024-09-16 RX ORDER — HYDROMORPHONE HYDROCHLORIDE 1 MG/ML
0.5 INJECTION, SOLUTION INTRAMUSCULAR; INTRAVENOUS; SUBCUTANEOUS EVERY 5 MIN PRN
Status: DISCONTINUED | OUTPATIENT
Start: 2024-09-16 | End: 2024-09-16 | Stop reason: HOSPADM

## 2024-09-16 RX ORDER — CYCLOBENZAPRINE HCL 10 MG
10 TABLET ORAL 3 TIMES DAILY
Status: DISCONTINUED | OUTPATIENT
Start: 2024-09-16 | End: 2024-09-20 | Stop reason: HOSPADM

## 2024-09-16 RX ORDER — DROPERIDOL 2.5 MG/ML
0.62 INJECTION, SOLUTION INTRAMUSCULAR; INTRAVENOUS ONCE AS NEEDED
Status: DISCONTINUED | OUTPATIENT
Start: 2024-09-16 | End: 2024-09-16 | Stop reason: HOSPADM

## 2024-09-16 RX ORDER — POTASSIUM CHLORIDE 20 MEQ/1
40 TABLET, EXTENDED RELEASE ORAL ONCE
Status: COMPLETED | OUTPATIENT
Start: 2024-09-16 | End: 2024-09-16

## 2024-09-16 RX ORDER — ROCURONIUM BROMIDE 10 MG/ML
INJECTION, SOLUTION INTRAVENOUS AS NEEDED
Status: DISCONTINUED | OUTPATIENT
Start: 2024-09-16 | End: 2024-09-16

## 2024-09-16 ASSESSMENT — COGNITIVE AND FUNCTIONAL STATUS - GENERAL
MOBILITY SCORE: 24
DAILY ACTIVITIY SCORE: 24

## 2024-09-16 ASSESSMENT — PAIN - FUNCTIONAL ASSESSMENT
PAIN_FUNCTIONAL_ASSESSMENT: 0-10

## 2024-09-16 ASSESSMENT — PAIN SCALES - GENERAL
PAINLEVEL_OUTOF10: 8
PAINLEVEL_OUTOF10: 5 - MODERATE PAIN
PAINLEVEL_OUTOF10: 7
PAINLEVEL_OUTOF10: 8
PAINLEVEL_OUTOF10: 10 - WORST POSSIBLE PAIN
PAINLEVEL_OUTOF10: 5 - MODERATE PAIN
PAINLEVEL_OUTOF10: 6
PAINLEVEL_OUTOF10: 10 - WORST POSSIBLE PAIN
PAINLEVEL_OUTOF10: 8
PAINLEVEL_OUTOF10: 10 - WORST POSSIBLE PAIN
PAINLEVEL_OUTOF10: 10 - WORST POSSIBLE PAIN
PAINLEVEL_OUTOF10: 3
PAINLEVEL_OUTOF10: 8
PAINLEVEL_OUTOF10: 5 - MODERATE PAIN
PAINLEVEL_OUTOF10: 10 - WORST POSSIBLE PAIN
PAINLEVEL_OUTOF10: 7
PAINLEVEL_OUTOF10: 2
PAINLEVEL_OUTOF10: 7
PAINLEVEL_OUTOF10: 10 - WORST POSSIBLE PAIN

## 2024-09-16 ASSESSMENT — PAIN DESCRIPTION - ORIENTATION: ORIENTATION: POSTERIOR

## 2024-09-16 ASSESSMENT — PAIN DESCRIPTION - LOCATION
LOCATION: BACK
LOCATION: BACK

## 2024-09-16 ASSESSMENT — PAIN SCALES - WONG BAKER: WONGBAKER_NUMERICALRESPONSE: HURTS LITTLE MORE

## 2024-09-16 NOTE — HOSPITAL COURSE
Lynnette Baig is a 44 y.o. female with a past medical history of HTN and vitamin D deficiency who presented to the [facility] ED on 9/13 with low back pain and LLE radiculopathy. MRI T/L spine with 3.7cm L4-L5 intradural lesion. Patient admitted to neurosurgery service for further management.     9/16 s/p L3-5 lami for intradural tumor resection (schwannoma).   9/17 Drain insertion site over sewn due to noted leakage, which resolved. Chemical dependency consulted due to reported prior daily heroine use and recommended initiation of suboxone.    9/19 HOB restrictions relaxed and patient able to get OOB to work with PT/OT. Aguilera removed without complication. Lumbar drain clamped in AM and subsequently dc'd.   9/20 Patient cleared for discharge home. Chemical dependency coordinated outpatient follow up and recommend discharge with suboxone at current dosing (1 film BID).     PT/OT evaluated patient and recommended low intensity continued level of care for which referral was placed for home care.     Patient discharged with detailed instructions and scheduled outpatient follow up.

## 2024-09-16 NOTE — CARE PLAN
The patient's goals for the shift include      The clinical goals for the shift include pt. will remain HSD during shift.    Over the shift, the patient did not make progress toward the following goals. Barriers to progression include . Recommendations to address these barriers include .

## 2024-09-16 NOTE — BRIEF OP NOTE
Date: 2024 - 2024  OR Location: [facility] OR    Name: Lynnette Baig, : 1980, Age: 44 y.o., MRN: [id number], Sex: female    Diagnosis  Pre-op Diagnosis      * Intradural extramedullary spinal tumor [D49.7] Post-op Diagnosis     * Intradural extramedullary spinal tumor [D49.7]     Procedures  Spine Lumbar Tumor Resection  45374 - OR LAMINECTOMY BX/EXC ISPI JUAN XDRL LUMBAR      Surgeons      * Gabe Thomas - Primary    Resident/Fellow/Other Assistant:  Surgeons and Role:     * Maria Ines Hammer MD - Resident - Assisting    Procedure Summary  Anesthesia: Anesthesia type not filed in the log.  ASA: III  Anesthesia Staff: Anesthesiologist: Jose Cruz Adrian MD; Lexi Menendez MD  CRNA: Logan Pierce, APRN-CRNA  C-AA: FINESSE Lyons  Anesthesia Resident: Lorenzo Bethea MD  SRNA: Adriane Gonzalez  Estimated Blood Loss: 150mL  Intra-op Medications: Administrations occurring from 1230 to 1410 on 24:  * No intraprocedure medications in log *           Anesthesia Record               Intraprocedure I/O Totals          Intake    Dexmedetomidine 0.00 mL    The total shown is the total volume documented since Anesthesia Start was filed.    Ketamine 0.00 mL    The total shown is the total volume documented since Anesthesia Start was filed.    LR bolus 1800.00 mL    Clevidipine Drip 0.00 mL    The total shown is the total volume documented since Anesthesia Start was filed.    Remifentanil Drip 0.00 mL    The total shown is the total volume documented since Anesthesia Start was filed.    Nitroglycerin Drip 0.00 mL    The total shown is the total volume documented since Anesthesia Start was filed.    Total Intake 1800 mL       Output    Urine 305 mL    Total Output 305 mL       Net    Net Volume 1495 mL          Specimen:   ID Type Source Tests Collected by Time   1 : INTRADURAL TUMOR Tissue BRAIN RESECTION SURGICAL PATHOLOGY EXAM Gabe Thomas MD PhD 2024 1716   2 : INTRADURAL TUMOR Tissue  BRAIN RESECTION SURGICAL PATHOLOGY EXAM Gabe Thomas MD PhD 9/16/2024 1745        Staff:   Circulator: Olga Bowensub Person: Slime Bowensub Person: Ada Cash Circulator: Vanessa Cash Scrub: Maury Cash Circulator: Sonia Cash Scrub: Marcellus          Findings: prelim pathology schwannoma    Complications:  None; patient tolerated the procedure well.     Disposition: PACU - hemodynamically stable.  Condition: stable  Specimens Collected:   ID Type Source Tests Collected by Time   1 : INTRADURAL TUMOR Tissue BRAIN RESECTION SURGICAL PATHOLOGY EXAM Gabe Thomas MD PhD 9/16/2024 1716   2 : INTRADURAL TUMOR Tissue BRAIN RESECTION SURGICAL PATHOLOGY EXAM Gabe Thomas MD PhD 9/16/2024 1465     Attending Attestation: I was present and scrubbed for the key portions of the procedure.    Gabe Thomas  Phone Number: [phone]

## 2024-09-16 NOTE — ANESTHESIA PROCEDURE NOTES
Peripheral IV  Date/Time: 9/16/2024 1:40 PM      Placement  Needle size: 18 G  Laterality: left  Location: hand  Local anesthetic: none  Site prep: alcohol  Technique: anatomical landmarks  Attempts: 1

## 2024-09-16 NOTE — PROGRESS NOTES
Occupational Therapy                 Therapy Communication Note    Patient Name: Lynnette Baig  MRN: [id number]  Department: [place] [address]  Room: [address] [place] [address]  Today's Date: 9/16/2024     Discipline: Occupational Therapy    Missed Visit Reason: Missed Visit Reason: Other (Comment) (Pt scheduled for OR 9/16 for L3-L5 lami for intradural tumor resection per Miguel Martin MD.)    Missed Time: Attempt        09/16/24 at 8:04 AM - SYLVIA RODRIGUEZ

## 2024-09-16 NOTE — ANESTHESIA PROCEDURE NOTES
Arterial Line:    Date/Time: 9/16/2024 1:55 PM    Staffing  Performed: CRNA   Authorized by: Jose Cruz Adrian MD    Performed by: Adriane Gonzalez    An arterial line was placed. Procedure performed using surface landmarks.in the OR for the following indication(s): continuous blood pressure monitoring and blood sampling needed.    A 20 gauge (size) (length), Arrow (type) catheter was placed into the Right radial artery, secured by Tegaderm,   Seldinger technique used.  Events:  patient tolerated procedure well with no complications.

## 2024-09-16 NOTE — PROGRESS NOTES
Physical Therapy                 Therapy Communication Note    Patient Name: Lynnette Baig  MRN: [id number]  Department: [place] [address]  Room: [address] [place] [address]  Today's Date: 9/16/2024     Discipline: Physical Therapy    Missed Visit Reason: Missed Visit Reason: Other (Comment) (Pt scheduled for L3-L5 lami for intradural tumor resection; will await post-op orders)    Missed Time: Attempt    Comment: 10:16am

## 2024-09-16 NOTE — ANESTHESIA PROCEDURE NOTES
Airway  Date/Time: 9/16/2024 1:42 PM  Urgency: elective    Airway not difficult    Staffing  Performed: CRNA   Authorized by: Jose Cruz Adrian MD    Performed by: Adriane Gonzalez  Patient location during procedure: OR    Indications and Patient Condition  Indications for airway management: anesthesia  Spontaneous Ventilation: absent  Sedation level: deep  Preoxygenated: yes  Patient position: sniffing  Mask difficulty assessment: 1 - vent by mask  Planned trial extubation    Final Airway Details  Final airway type: endotracheal airway      Successful airway: ETT  Cuffed: yes   Successful intubation technique: direct laryngoscopy  Facilitating devices/methods: intubating stylet and anterior pressure/BURP  Endotracheal tube insertion site: oral  Blade: Gavin  Blade size: #3  ETT size (mm): 7.0  Cormack-Lehane Classification: grade I - full view of glottis  Placement verified by: chest auscultation, capnometry and palpation of cuff   Measured from: lips  ETT to lips (cm): 21  Number of attempts at approach: 1

## 2024-09-16 NOTE — ANESTHESIA PREPROCEDURE EVALUATION
Patient: Lynnette Baig    Procedure Information       Date/Time: 09/16/24 1230    Procedure: Spine Lumbar Tumor Resection - L3-5 laminectomy for intradural tumor resection    Location: [facility] OR 26 / [facility] OR    Surgeons: Gabe Thomas MD PhD            Relevant Problems   Anesthesia (within normal limits)      Cardiac   (+) HTN (hypertension)      Pulmonary (within normal limits)      Neuro   (+) Intradural extramedullary spinal tumor      /Renal (within normal limits)      Liver (within normal limits)      Endocrine (within normal limits)      Musculoskeletal   (+) Acute exacerbation of chronic low back pain   Pt uses heroin, last 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    Past Surgical History:   Procedure Laterality Date   • ANKLE SURGERY Left    • OTHER SURGICAL HISTORY Left     Thigh abscess surgery     No anesthesia complications.  Pt is NPO after midnight.    Clinical information reviewed:    Allergies  Meds               NPO Detail:  No data recorded     Physical Exam    Airway  Mallampati: III  TM distance: >3 FB  Neck ROM: full     Cardiovascular - normal exam  Rhythm: regular  Rate: normal     Dental   (+) upper dentures, lower dentures     Pulmonary - normal exam     Abdominal          Anesthesia Plan    History of general anesthesia?: yes  History of complications of general anesthesia?: no    ASA 3     general     intravenous induction   Anesthetic plan and risks discussed with patient.  Use of blood products discussed with patient who consented to blood products.    Plan discussed with CRNA and attending.

## 2024-09-16 NOTE — ANESTHESIA PROCEDURE NOTES
Peripheral IV  Date/Time: 9/16/2024 1:45 PM      Placement  Needle size: 16 G  Laterality: right  Location: hand  Local anesthetic: none  Site prep: alcohol  Technique: anatomical landmarks  Attempts: 1

## 2024-09-16 NOTE — PROGRESS NOTES
Lynnette Baig is a 44 y.o. female on day 2 of admission presenting with Intradural extramedullary spinal tumor.    Subjective   No acute events overnight, pt endorses shooting LLE pain and spasms this AM. States she has been unable to sleep at night due to pain.       Objective     Physical Exam  NEURO:     NAD, A&Ox3     Cranial Nerves II-XII: PERRL, EOMI, Face symmetric, Facial SILT, Palate/Tongue midline and symmetric, shoulder shrugs symmetric, hearing intact to finger rubs bilaterally        MOTOR:         Muscle bulk and tone were normal in both upper and lower extremities.           RUE D5 / B5 / T5 / HG 5/ IO 5       LUE D5 / B5 / T5 / HG 5/ IO 5       Negative juarez          RLE HF5 / KE 5/ DF 5/ PF 5       LLE HF5 / KE 5/ DF 5/ PF 5       No clonus       SENSORY:  LLE radiculopathy        COORDINATION:        BUE, finger-nose-finger was intact without dysmetria or overshoot.       BLE, heel-to-shin was intact.       PSYCH: appropriate  SKIN: no obvious lesions      Last Recorded Vitals  Blood pressure 153/89, pulse (!) 112, temperature 35.6 °C (96.1 °F), resp. rate 20, height 1.524 m (5'), weight 87.8 kg (193 lb 9 oz), SpO2 98%.  Intake/Output last 3 Shifts:  I/O last 3 completed shifts:  In: 120 (1.4 mL/kg) [P.O.:120]  Out: 600 (6.8 mL/kg) [Urine:600 (0.2 mL/kg/hr)]  Weight: 87.8 kg       Assessment/Plan   Assessment & Plan  Intradural extramedullary spinal tumor    Lynnette Baig is a 44 y.o. female with h/o HTN, vit D deficiency, p/w LBP w LLE radiculopathy, MRI T/L 3.7 cm L4-L5 intradural lesion     Symptoms are consistent with:  Lumbar Radiculopathy     Patient is with L4-L5 intradural lesion with severe back pain and lower extremity radiculopathy.  Will need surgical intervention for resection of the tumor.     Plan  Floor  Patient with no concerns on preoperative labs   OR Today (9/16) for L3-L5 lami for intradural tumor resection  PTOT    Miguel Martin MD

## 2024-09-17 LAB
ALBUMIN SERPL BCP-MCNC: 3.4 G/DL (ref 3.4–5)
AMPHETAMINES UR QL SCN: ABNORMAL
ANION GAP SERPL CALC-SCNC: 13 MMOL/L (ref 10–20)
BARBITURATES UR QL SCN: ABNORMAL
BENZODIAZ UR QL SCN: ABNORMAL
BUN SERPL-MCNC: 17 MG/DL (ref 6–23)
BZE UR QL SCN: ABNORMAL
CALCIUM SERPL-MCNC: 8.9 MG/DL (ref 8.6–10.6)
CANNABINOIDS UR QL SCN: ABNORMAL
CHLORIDE SERPL-SCNC: 104 MMOL/L (ref 98–107)
CO2 SERPL-SCNC: 27 MMOL/L (ref 21–32)
CREAT SERPL-MCNC: 0.81 MG/DL (ref 0.5–1.05)
EGFRCR SERPLBLD CKD-EPI 2021: >90 ML/MIN/1.73M*2
ERYTHROCYTE [DISTWIDTH] IN BLOOD BY AUTOMATED COUNT: 14.7 % (ref 11.5–14.5)
FENTANYL+NORFENTANYL UR QL SCN: ABNORMAL
GLUCOSE SERPL-MCNC: 124 MG/DL (ref 74–99)
HCT VFR BLD AUTO: 32.8 % (ref 36–46)
HGB BLD-MCNC: 10.1 G/DL (ref 12–16)
MCH RBC QN AUTO: 26.5 PG (ref 26–34)
MCHC RBC AUTO-ENTMCNC: 30.8 G/DL (ref 32–36)
MCV RBC AUTO: 86 FL (ref 80–100)
METHADONE UR QL SCN: ABNORMAL
NRBC BLD-RTO: 0 /100 WBCS (ref 0–0)
OPIATES UR QL SCN: ABNORMAL
OXYCODONE+OXYMORPHONE UR QL SCN: ABNORMAL
PCP UR QL SCN: ABNORMAL
PHOSPHATE SERPL-MCNC: 3.7 MG/DL (ref 2.5–4.9)
PLATELET # BLD AUTO: 438 X10*3/UL (ref 150–450)
POTASSIUM SERPL-SCNC: 3.3 MMOL/L (ref 3.5–5.3)
RBC # BLD AUTO: 3.81 X10*6/UL (ref 4–5.2)
SODIUM SERPL-SCNC: 141 MMOL/L (ref 136–145)
WBC # BLD AUTO: 13.9 X10*3/UL (ref 4.4–11.3)

## 2024-09-17 PROCEDURE — 2500000001 HC RX 250 WO HCPCS SELF ADMINISTERED DRUGS (ALT 637 FOR MEDICARE OP): Performed by: STUDENT IN AN ORGANIZED HEALTH CARE EDUCATION/TRAINING PROGRAM

## 2024-09-17 PROCEDURE — 2500000005 HC RX 250 GENERAL PHARMACY W/O HCPCS: Performed by: STUDENT IN AN ORGANIZED HEALTH CARE EDUCATION/TRAINING PROGRAM

## 2024-09-17 PROCEDURE — 36415 COLL VENOUS BLD VENIPUNCTURE: CPT | Performed by: PHYSICIAN ASSISTANT

## 2024-09-17 PROCEDURE — 2500000004 HC RX 250 GENERAL PHARMACY W/ HCPCS (ALT 636 FOR OP/ED): Performed by: STUDENT IN AN ORGANIZED HEALTH CARE EDUCATION/TRAINING PROGRAM

## 2024-09-17 PROCEDURE — 84520 ASSAY OF UREA NITROGEN: CPT | Performed by: PHYSICIAN ASSISTANT

## 2024-09-17 PROCEDURE — 85027 COMPLETE CBC AUTOMATED: CPT | Performed by: PHYSICIAN ASSISTANT

## 2024-09-17 PROCEDURE — 99222 1ST HOSP IP/OBS MODERATE 55: CPT | Performed by: STUDENT IN AN ORGANIZED HEALTH CARE EDUCATION/TRAINING PROGRAM

## 2024-09-17 PROCEDURE — 1100000001 HC PRIVATE ROOM DAILY

## 2024-09-17 RX ORDER — BUPRENORPHINE AND NALOXONE 2; .5 MG/1; MG/1
1 FILM, SOLUBLE BUCCAL; SUBLINGUAL 2 TIMES DAILY
Status: DISCONTINUED | OUTPATIENT
Start: 2024-09-20 | End: 2024-09-18

## 2024-09-17 RX ORDER — BUPRENORPHINE AND NALOXONE 2; .5 MG/1; MG/1
0.25 FILM, SOLUBLE BUCCAL; SUBLINGUAL ONCE
Status: DISCONTINUED | OUTPATIENT
Start: 2024-09-17 | End: 2024-09-18

## 2024-09-17 RX ORDER — BUPRENORPHINE AND NALOXONE 2; .5 MG/1; MG/1
0.5 FILM, SOLUBLE BUCCAL; SUBLINGUAL 2 TIMES DAILY
Status: DISCONTINUED | OUTPATIENT
Start: 2024-09-19 | End: 2024-09-18

## 2024-09-17 RX ORDER — BUPRENORPHINE AND NALOXONE 2; .5 MG/1; MG/1
0.25 FILM, SOLUBLE BUCCAL; SUBLINGUAL 2 TIMES DAILY
Status: DISCONTINUED | OUTPATIENT
Start: 2024-09-18 | End: 2024-09-18

## 2024-09-17 RX ORDER — HEPARIN SODIUM 5000 [USP'U]/ML
5000 INJECTION, SOLUTION INTRAVENOUS; SUBCUTANEOUS EVERY 8 HOURS
Status: DISCONTINUED | OUTPATIENT
Start: 2024-09-18 | End: 2024-09-20 | Stop reason: HOSPADM

## 2024-09-17 SDOH — ECONOMIC STABILITY: INCOME INSECURITY: IN THE LAST 12 MONTHS, WAS THERE A TIME WHEN YOU WERE NOT ABLE TO PAY THE MORTGAGE OR RENT ON TIME?: NO

## 2024-09-17 SDOH — ECONOMIC STABILITY: FOOD INSECURITY: WITHIN THE PAST 12 MONTHS, YOU WORRIED THAT YOUR FOOD WOULD RUN OUT BEFORE YOU GOT MONEY TO BUY MORE.: SOMETIMES TRUE

## 2024-09-17 SDOH — ECONOMIC STABILITY: FOOD INSECURITY
WITHIN THE PAST 12 MONTHS, YOU WORRIED THAT YOUR FOOD WOULD RUN OUT BEFORE YOU GOT THE MONEY TO BUY MORE.: SOMETIMES TRUE

## 2024-09-17 SDOH — ECONOMIC STABILITY: TRANSPORTATION INSECURITY
IN THE PAST 12 MONTHS, HAS LACK OF TRANSPORTATION KEPT YOU FROM MEETINGS, WORK, OR FROM GETTING THINGS NEEDED FOR DAILY LIVING?: PATIENT DECLINED

## 2024-09-17 SDOH — ECONOMIC STABILITY: TRANSPORTATION INSECURITY: IN THE PAST 12 MONTHS, HAS LACK OF TRANSPORTATION KEPT YOU FROM MEDICAL APPOINTMENTS OR FROM GETTING MEDICATIONS?: YES

## 2024-09-17 SDOH — ECONOMIC STABILITY: FOOD INSECURITY: WITHIN THE PAST 12 MONTHS, THE FOOD YOU BOUGHT JUST DIDN'T LAST AND YOU DIDN'T HAVE MONEY TO GET MORE.: SOMETIMES TRUE

## 2024-09-17 SDOH — ECONOMIC STABILITY: HOUSING INSECURITY: IN THE PAST 12 MONTHS HAS THE ELECTRIC, GAS, OIL, OR WATER COMPANY THREATENED TO SHUT OFF SERVICES IN YOUR HOME?: YES

## 2024-09-17 SDOH — ECONOMIC STABILITY: HOUSING INSECURITY

## 2024-09-17 SDOH — ECONOMIC STABILITY: FOOD INSECURITY

## 2024-09-17 SDOH — ECONOMIC STABILITY: TRANSPORTATION INSECURITY

## 2024-09-17 SDOH — ECONOMIC STABILITY: HOUSING INSECURITY: IN THE LAST 12 MONTHS, WAS THERE A TIME WHEN YOU WERE NOT ABLE TO PAY THE MORTGAGE OR RENT ON TIME?: NO

## 2024-09-17 SDOH — ECONOMIC STABILITY: TRANSPORTATION INSECURITY
IN THE PAST 12 MONTHS, HAS THE LACK OF TRANSPORTATION KEPT YOU FROM MEDICAL APPOINTMENTS OR FROM GETTING MEDICATIONS?: YES

## 2024-09-17 SDOH — ECONOMIC STABILITY: HOUSING INSECURITY: IN THE LAST 12 MONTHS, HOW MANY PLACES HAVE YOU LIVED?: 1

## 2024-09-17 SDOH — ECONOMIC STABILITY: GENERAL

## 2024-09-17 ASSESSMENT — PAIN SCALES - GENERAL
PAINLEVEL_OUTOF10: 0 - NO PAIN
PAINLEVEL_OUTOF10: 7
PAINLEVEL_OUTOF10: 2
PAINLEVEL_OUTOF10: 8

## 2024-09-17 ASSESSMENT — COGNITIVE AND FUNCTIONAL STATUS - GENERAL
TOILETING: A LOT
DAILY ACTIVITIY SCORE: 22
CLIMB 3 TO 5 STEPS WITH RAILING: TOTAL
WALKING IN HOSPITAL ROOM: A LITTLE
STANDING UP FROM CHAIR USING ARMS: A LITTLE

## 2024-09-17 ASSESSMENT — ACTIVITIES OF DAILY LIVING (ADL): LACK_OF_TRANSPORTATION: PATIENT DECLINED

## 2024-09-17 ASSESSMENT — PAIN - FUNCTIONAL ASSESSMENT
PAIN_FUNCTIONAL_ASSESSMENT: 0-10
PAIN_FUNCTIONAL_ASSESSMENT: 0-10

## 2024-09-17 ASSESSMENT — SOCIAL DETERMINANTS OF HEALTH (SDOH): IN THE PAST 12 MONTHS, HAS THE ELECTRIC, GAS, OIL, OR WATER COMPANY THREATENED TO SHUT OFF SERVICE IN YOUR HOME?: YES

## 2024-09-17 ASSESSMENT — LIFESTYLE VARIABLES
TOTAL_SCORE: 2

## 2024-09-17 NOTE — NURSING NOTE
Skin Assessment:  This nurse and CTA Pat ALVAREZ Looked at Pt skin. Pt had Aguilera cath placed. Catheter is in place and no drainage. Pt has a TRINIDAD drain placed in lower middle back. No drainage or reddness around site. Open to air. Drainage has serosanguineous output. Pt has a incision in middle of back open to air. No drainage or reddness around the incision.  Pt has generalized edema non pitting. Pulses in R,L radial and R,L pedal strong.

## 2024-09-17 NOTE — ANESTHESIA POSTPROCEDURE EVALUATION
Patient: Lynnette Baig    Procedure Summary       Date: 09/16/24 Room / Location: [facility] OR 26 / [facility]    Anesthesia Start: 1330 Anesthesia Stop: 2027    Procedure: Spine Lumbar Tumor Resection (Back) Diagnosis:       Intradural extramedullary spinal tumor      (Intradural extramedullary spinal tumor [D49.7])    Surgeons: Gabe Thomas MD PhD Responsible Provider: Lexi Menendez MD    Anesthesia Type: general ASA Status: 3            Anesthesia Type: general    Vitals Value Taken Time   /87 09/16/24 2026   Temp 36 09/16/24 2031   Pulse 86 09/16/24 2029   Resp 11 09/16/24 2029   SpO2 96 % 09/16/24 2029   Vitals shown include unfiled device data.    Anesthesia Post Evaluation    Patient participation: complete - patient participated  Level of consciousness: responsive to light touch  Pain management: adequate  Multimodal analgesia pain management approach  Airway patency: patent  Cardiovascular status: acceptable  Respiratory status: acceptable  Hydration status: acceptable  Postoperative Nausea and Vomiting: none        No notable events documented.

## 2024-09-17 NOTE — PROGRESS NOTES
Occupational Therapy                   Therapy Communication Note    Patient Name: Lynnette Baig  MRN: [id number]  Department: [place] [address]  Room: [address] [place] [address]  Today's Date: 9/17/2024     Discipline: Occupational Therapy    Missed Visit Reason: Missed Visit Reason: Patient placed on medical hold (Per documentation and Endy Alcantar MD- pt to remain flat until 9/18 AM. OT to hold eval today and reattempt as appropriate)    Missed Time: Attempt     Yes

## 2024-09-17 NOTE — CONSULTS
Inpatient consult to Chemical Dependency  Consult performed by: Brenda Saldivar  Consult ordered by: Miguel Magaña MD PhD      Consult    HISTORY OF PRESENT ILLNESS:  Lynnette Baig is a 44 y.o. female with a past psychiatric history of depression, substance abuse and a past medical history of HTN and chronic back pain who was admitted to [facility] on 9/13/24 for acute exacerbation of LBP and LLE radiculopathy. Psychiatry was consulted on 9/16/24 for chemical dependency assessment of opioid withdrawal.    On chart review, patient has had recurrent falls, back pain, and radiculopathy. 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 Urine toxicology screen on admission was positive for amphetamines, opiates, fentanyl, oxycodone, cocaine.    On interview, patient stated that she has been using opioid off and on for several years. She mentioned she underwent opioid addiction treatment with Suboxone around 2958-9032, which she had a long sobriety approximately 4 years. Then she relapsed and was able to stop on her own for 1-2 years. She most recently began using again in 2022; her prior relapse occurred following her father's death in 2019, lasting until 2021 when she last completed addiction treatment. She reported that she has experienced multiple symptoms of withdrawal, including nausea, vomiting (i.e., dry heaving), agitation, and formication, which she does not have any of them right now.    Patient recalled an exhaustive trial of many different antidepressant medications in the past to identify an effective treatment for her depression; however, due to a lapse in her insurance coverage she stopped taking the medication and now cannot recall its name. She stated that she was first diagnosed with depression at ~17 y.o. and started on wellbutrin, subsequently switching to Zoloft; neither of  these were effective in treating the depression.     She reported that her sleep has not been great in the hospital (i.e., 4 hours total, waking in middle of night) but denied insomnia. She endorsed past thoughts of self-harm but denied homicidal thoughts, AH/VH, psychosis symptoms.  She was calm and in good spirits, especially when discussing her hobbies (art, sketching, painting) which she stated help with her mood. She expressed interest in art therapy.    PSYCHIATRIC HISTORY  Prior diagnoses: Depression  Prior hospitalizations: negative  History of suicide attempts: negative  History of self-harm: SI following father's death in 2019, no plan or action  History of trauma/abuse/loss: Father's death in 2019  History of violence: negative    Current psychiatrist: Denies  Current mental health agency: Denies  Current : Denies  Current outpatient treatment: Denies    SUBSTANCE USE HISTORY   She reports that she has been smoking cigarettes. She has never used smokeless tobacco. She reports current drug use. Drug: Heroin. She reports that she does not drink alcohol.  Heroin use exacerbated by loss of father in 2019    Tobacco: Quit at 24 y.o.  Alcohol: 2 drinks per month, socially or before bed to relax     - History of severe withdrawal: Denies     - Last use: Several weeks ago  Cannabis: Denies  Other substances: Endorses Heroin, Oxycodone. Denies stimulants (i.e, has not knowingly used since teenage years).     - Last use: Daily prior to hospitalization via intranasal.     - History of overdose: Denies     - Longest period of sobriety: 4 years  Prior substance use disorder treatment: Approx. in [id number] (suboxone)    SOCIAL HISTORY  Social History     Socioeconomic History    Marital status:    Tobacco Use    Smoking status: Every Day     Types: Cigarettes    Smokeless tobacco: Never   Vaping Use    Vaping status: Never Used   Substance and Sexual Activity    Alcohol use: Never    Drug use: Yes      Types: Heroin     Social Determinants of Health     Financial Resource Strain: Low Risk  (9/14/2024)    Overall Financial Resource Strain (CARDIA)     Difficulty of Paying Living Expenses: Not very hard   Food Insecurity: No Food Insecurity (8/13/2024)    Received from [facility]    Hunger Vital Sign     Worried About Running Out of Food in the Last Year: Never true     Ran Out of Food in the Last Year: Never true   Transportation Needs: No Transportation Needs (9/14/2024)    PRAPARE - Transportation     Lack of Transportation (Medical): No     Lack of Transportation (Non-Medical): No   Recent Concern: Transportation Needs - Unmet Transportation Needs (8/13/2024)    Received from [facility]    PRAPARE - Transportation     Lack of Transportation (Medical): Yes     Lack of Transportation (Non-Medical): Yes   Physical Activity: Sufficiently Active (8/13/2024)    Received from [facility]    Exercise Vital Sign     Days of Exercise per Week: 7 days     Minutes of Exercise per Session: 60 min   Stress: No Stress Concern Present (8/13/2024)    Received from [facility]    [place] [employer] - Occupational Stress Questionnaire     Feeling of Stress : Not at all   Social Connections: Socially Isolated (8/13/2024)    Received from [facility]    Social Connection and Isolation Panel [NHANES]     Frequency of Communication with Friends and Family: Once a week     Frequency of Social Gatherings with Friends and Family: Never     Attends Hinduism Services: Never     Active Member of Clubs or Organizations: No     Attends Club or Organization Meetings: Never     Marital Status:    Housing Stability: Unknown (9/14/2024)    Housing Stability Vital Sign     Unable to Pay for Housing in the Last Year: No     Homeless in the Last Year: No      Current living situation: Lives with   Current employment/source of income: Artist  Current stressors: Lack of transportation  only 1 car    Born and raised: Born in [place], has lived whole life in [place], [place].  Family: , 2 children, mother, stepfather  Childhood: no notable events  Education: High school, Home state license cosmetology, Homeschooled for medical coding and billing  History of learning difficulty: negative  Marital status:    Children: 2 adult children   Social support: Mother and , 2 children, stepfather  Legal history: 2020   history: negative  Access to weapons: Guns at home    PAST MEDICAL HISTORY  Past Medical History:   Diagnosis Date    Benign hypertension           PAST SURGICAL HISTORY  Past Surgical History:   Procedure Laterality Date    ANKLE SURGERY Left     OTHER SURGICAL HISTORY Left     Thigh abscess surgery          FAMILY HISTORY  No family history on file.     ALLERGIES  Latex    OARRS REVIEW  OARRS checked: No data recorded     HOME MEDICATIONS  Medication Documentation Review Audit       Reviewed by Eagle Melo, PharmD (Pharmacist) on 24 at 1437      Medication Order Taking? Sig Documenting Provider Last Dose Status   acetaminophen (Tylenol) 500 mg tablet [id number]  Take 2 tablets (1,000 mg) by mouth every 8 hours. Historical Provider, MD  Active   cyclobenzaprine (Flexeril) 10 mg tablet [id number] No Take 1 tablet (10 mg) by mouth 3 times a day as needed for muscle spasms (Pain).   Patient not taking: Reported on 2024    Omero Young, DO Not Taking Active   cyclobenzaprine (Flexeril) 10 mg tablet [id number]  Take 1 tablet (10 mg) by mouth 2 times a day as needed for muscle spasms for up to 10 days. Maury Ballesteros PA-C   24 [id number]   ibuprofen 200 mg tablet [id number]  Take 2 tablets (400 mg) by mouth every 12 hours. Historical Provider, MD  Active   lidocaine (Lidoderm) 5 % patch [id number] No Place 1 patch over 12 hours on the skin once daily.   Patient not taking: Reported on 2024    Omero Young, DO Not Taking  Active   lisinopriL-hydrochlorothiazide 20-25 mg tablet [id number]  Take 1 tablet by mouth once daily at bedtime. Historical Provider, MD  Active   oxyCODONE (Roxicodone) 5 mg immediate release tablet [id number]  Take 1 tablet (5 mg) by mouth every 4 hours if needed for severe pain (7 - 10). Historical Provider, MD  Active                     CURRENT MEDICATIONS  Scheduled medications  acetaminophen, 975 mg, oral, q6h CORI  cyclobenzaprine, 10 mg, oral, TID  ketorolac, 30 mg, intravenous, q6h CORI  lidocaine, 2 patch, transdermal, Daily  lisinopril 20 mg, hydroCHLOROthiazide 25 mg for Zestoretic/Prinizide, , oral, Nightly  polyethylene glycol, 17 g, oral, Daily  sennosides-docusate sodium, 2 tablet, oral, BID        Continuous medications  sodium chloride 0.9%, 75 mL/hr, Last Rate: 75 mL/hr (09/16/24 0047)        PRN medications  PRN medications: cloNIDine, dicyclomine, HYDROmorphone, hydrOXYzine HCL, naloxone, ondansetron, oxyCODONE, oxyCODONE, oxyCODONE, traZODone     LABS  Results for orders placed or performed during the hospital encounter of 09/14/24 (from the past 24 hour(s))   Verify Abo/Rh Group Test (VERAB)   Result Value Ref Range    ABO TYPE AB     Rh TYPE POS    BLOOD GAS ARTERIAL FULL PANEL   Result Value Ref Range    POCT pH, Arterial 7.45 (H) 7.38 - 7.42 pH    POCT pCO2, Arterial 33 (L) 38 - 42 mm Hg    POCT pO2, Arterial 188 (H) 85 - 95 mm Hg    POCT SO2, Arterial 100 94 - 100 %    POCT Oxy Hemoglobin, Arterial 97.7 94.0 - 98.0 %    POCT Hematocrit Calculated, Arterial 33.0 (L) 36.0 - 46.0 %    POCT Sodium, Arterial 132 (L) 136 - 145 mmol/L    POCT Potassium, Arterial 3.4 (L) 3.5 - 5.3 mmol/L    POCT Chloride, Arterial 101 98 - 107 mmol/L    POCT Ionized Calcium, Arterial 1.26 1.10 - 1.33 mmol/L    POCT Glucose, Arterial 149 (H) 74 - 99 mg/dL    POCT Lactate, Arterial 0.9 0.4 - 2.0 mmol/L    POCT Base Excess, Arterial -0.6 -2.0 - 3.0 mmol/L    POCT HCO3 Calculated, Arterial 22.9 22.0 - 26.0 mmol/L     POCT Hemoglobin, Arterial 11.0 (L) 12.0 - 16.0 g/dL    POCT Anion Gap, Arterial 12 10 - 25 mmo/L    Patient Temperature 37.0 degrees Celsius    FiO2 40 %   Blood Gas Arterial Full Panel   Result Value Ref Range    POCT pH, Arterial 7.43 (H) 7.38 - 7.42 pH    POCT pCO2, Arterial 35 (L) 38 - 42 mm Hg    POCT pO2, Arterial 179 (H) 85 - 95 mm Hg    POCT SO2, Arterial 100 94 - 100 %    POCT Oxy Hemoglobin, Arterial 97.5 94.0 - 98.0 %    POCT Hematocrit Calculated, Arterial 32.0 (L) 36.0 - 46.0 %    POCT Sodium, Arterial 132 (L) 136 - 145 mmol/L    POCT Potassium, Arterial 3.2 (L) 3.5 - 5.3 mmol/L    POCT Chloride, Arterial 101 98 - 107 mmol/L    POCT Ionized Calcium, Arterial 1.18 1.10 - 1.33 mmol/L    POCT Glucose, Arterial 136 (H) 74 - 99 mg/dL    POCT Lactate, Arterial 0.8 0.4 - 2.0 mmol/L    POCT Base Excess, Arterial -0.8 -2.0 - 3.0 mmol/L    POCT HCO3 Calculated, Arterial 23.2 22.0 - 26.0 mmol/L    POCT Hemoglobin, Arterial 10.8 (L) 12.0 - 16.0 g/dL    POCT Anion Gap, Arterial 11 10 - 25 mmo/L    Patient Temperature 37.0 degrees Celsius    FiO2 35 %        IMAGING  XR abdomen 1 view    Result Date: 9/16/2024  Interpreted By:  Farideh Carrillo and Baker Zachary STUDY: XR ABDOMEN 1 VIEW;  9/16/2024 8:06 pm   INDICATION: Signs/Symptoms:RSI MIssing blade [place] OR 26 phone# [phone].   COMPARISON: Chest radiograph on 09/14/2024.   ACCESSION NUMBER(S): [id number]   ORDERING CLINICIAN: PENNY FRENCH   FINDINGS: Single AP view of the abdomen.   Surgical drainage tube overlies the central abdomen. A partially imaged tube overlying the epigastric region, presumably a nasogastric tube. No radiopaque retained surgical instruments identified.   Nonobstructive bowel gas pattern.   Interval lower lumbar laminectomy.       1. No evidence of retained surgical instrument. 2. Surgical drain overlies the central abdomen.   I personally reviewed the images/study and I agree with the findings as stated by Dr. Jm Montelongo M.D. This  "study was interpreted at [facility], [place], [place].   MACRO: Findings above were communicated by Dr. Jm Montelongo to Sonia Medina by telephone at 8:10 p.m. on 09/16/2024.   Signed by: Farideh Carrillo 9/16/2024 8:23 PM Dictation workstation:   [id number]    FL fluoro images no charge    Result Date: 9/16/2024  These images are not reportable by radiology and will not be interpreted by  Radiologists.        OBJECTIVE    VITALS      9/16/2024     9:45 PM 9/16/2024    10:00 PM 9/16/2024    10:03 PM 9/16/2024    10:56 PM 9/17/2024    12:49 AM 9/17/2024     4:18 AM 9/17/2024     8:26 AM   Vitals   Systolic 145 141 151 151 150 124 145   Diastolic 81 78 77 77 75 77 85   Heart Rate 97 99 93 94 97 114 105   Temp   36.3 °C (97.3 °F) 36.9 °C (98.4 °F) 36.6 °C (97.9 °F) 37.1 °C (98.8 °F) 37 °C (98.6 °F)   Resp 14 16 15 16 16 18 18        PSYCHIATRIC REVIEW OF SYSTEMS  Depression: sleep disturbance: frequent awakening  Anxiety: sleep disturbance  Fátima: negative  Psychosis: negative  Delirium: negative   Trauma: negative    MENTAL STATUS EXAM  General: No acute distress, laying in bed comfortably during interview.  Appearance:  female appearing stated age, appropriately dressed/groomed.  Attitude: Calm, in good spirits.  Behavior: Cooperative, appropriate eye contact, .  Motor Activity: No psychomotor agitation/retardation, no tics noted.  Speech: Normal rate/rhythm/volume.  Mood: \"In pain but recovering\".  Affect: Full range, mood congruence.  Thought Process: Linear, goal-directed, organized.  Thought Content: Denied current suicidal ideation. Denied homicidal ideation. No delusions elicited.  Perception: Denied visual hallucinations. Denied auditory hallucinations. Did not appear to be internally stimulated.  Cognition: Alert and oriented to person, place, time. No deficits in attention, concentration or language. Able to answer questions appropriately throughout interview, " and memory appeared intact with adequate fund of knowledge.  Insight: Appropriate understanding of current circumstances.  Judgement: Intact, able to make reasonable decisions about ordinary activities of daily living and medical care.     PSYCHIATRIC RISK ASSESSMENT  Violence Risk Factors:  access to weapons, substance abuse , and lower socioeconomic status  Acute Risk of Harm to Others is Considered: Low  Suicide Risk Factors: ; /[place] and chronic pain  Protective Factors: sense of responsibility towards family  Acute Risk of Harm to Self is Considered: Low    ASSESSMENT AND PLAN  Lynnette Baig is a 44 y.o. 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 Psychiatry was consulted on 9/16/24 for chemical dependency assessment of heroin withdrawal.    On initial assessment, urine toxicology screen on admission was positive for amphetamines, opiates, fentanyl, oxycodone, cocaine. Patient reported that she has been using heroin and oxycodone on a daily basis prior to her hospitalization. She stated that in the past she has experienced multiple symptoms of withdrawal, including nausea, vomiting (i.e., dry heaving), agitation, and formication. She reported that her sleep has not been great in the hospital (i.e., 4 hours total, waking in middle of night) but denied insomnia. She endorsed past thoughts of self-harm but denied homicidal thoughts, AH/VH, psychosis symptoms.  She was calm and in good spirits, especially when discussing her hobbies (art, sketching, painting) which she stated help with her mood. She expressed interest in art therapy.    Post-op patient is receiving oxycodone for pain management and is  "not currently exhibiting opioid withdrawal symptoms.    Impression:  Opioid Use Disorder  Depression by hx    Recommendations  Safety:  - Patient does not currently meet criteria for inpatient psychiatric admission.   - To evaluate decision-making capacity, recommend use of the Capacity Evaluation Tool. Search “[facility] Capacity Evaluation under SmartText\"   - Patient does not require a 1:1 sitter from a psychiatric perspective at this time.  - Defer to primary team decision for 1:1 sitter for safety precautions.  - As with all hospitalized patients, would recommend delirium precautions, as below.    Work-up:  - EKG (8/30/24): QTc of 407 ms--recommend update EKG    Ancillary Services:  - Recommend art therapy consult.    Medications:  - Please order Suboxone (2-0.5)   1/4 film SL daily today 9/16  1/4 film SL BID on 9/17  1/2 film SL BID on 9/18   1 full film SL BID on 9/19    For now, continue current full agonist opioid regimen, will reassess in 2-3 days to decrease as continue transition to suboxone.   Do not need to score COWS    Pt encouraged to follow up with clinic for rx management    Medication Consent: n/a; consult service    Follow-up:  - Patient will be given list of outpatient chem dep resources prior to discharge.    ==========   - Discussed recommendations with primary team.  - Psychiatry will continue to follow.    Thank you for allowing us to participate in the care of this patient. Please page [phone] with any questions or concerns.    Patient seen and staffed/discussed with Dr. Porter, who agrees with above plan.    Brenda Saldivar, Ph.D.  Medical Student, MS-3  [facility]    -----------------------------------------------------------------  Senior Resident Addendum:  I examined & discussed the patient with medical student and Dr. Porter. I have reviewed and edited the note above.     Marvin Sheehan MD (Paul)  PGY-4 Neurology  "

## 2024-09-17 NOTE — PROGRESS NOTES
Lynnette Baig is a 44 y.o. female on day 3 of admission presenting with Intradural extramedullary spinal tumor.    Subjective   Stable after surgery, very sore states she is unsure if sxs better yet       Objective     Physical Exam    AOx3        RUE D5 / B5 / T5 / HG 5/ IO 5       LUE D5 / B5 / T5 / HG 5/ IO 5       Negative juarez          RLE HF5 / KE 5/ DF 5/ PF 5       LLE HF5 / KE 5/ DF 5/ PF 5       No clonus       SENSORY:  LLE radiculopathy      Incision cdi      Last Recorded Vitals  Blood pressure 124/77, pulse (!) 114, temperature 37.1 °C (98.8 °F), resp. rate 18, height 1.524 m (5'), weight 87.8 kg (193 lb 9 oz), SpO2 97%.  Intake/Output last 3 Shifts:  I/O last 3 completed shifts:  In: 1920 (21.9 mL/kg) [P.O.:120; IV Piggyback:1800]  Out: 305 (3.5 mL/kg) [Urine:305 (0.1 mL/kg/hr)]  Weight: 87.8 kg     Relevant Results                              Assessment/Plan   Assessment & Plan  Intradural extramedullary spinal tumor    HTN (hypertension)    Lynnette Baig is a 44 y.o. female with h/o HTN, vit D deficiency, p/w LBP w LLE radiculopathy, MRI T/L 3.7 cm L4-L5 intradural lesion     9/16 s/p L3-5 lami for intradural tumor resection (schwannoma)     Plan  Floor  Maintain drain  Flat until 9/18 AM  PTOT  SCDs           Jef Nava MD

## 2024-09-17 NOTE — PROGRESS NOTES
Physical Therapy                 Therapy Communication Note    Patient Name: Lynnette Baig  MRN: [id number]  Department: [place] [id number]  Room: [address] [place] [address]  Today's Date: 9/17/2024     Discipline: Physical Therapy    Missed Visit Reason: Missed Visit Reason: Other (Comment), Patient placed on medical hold (per Dr. Alcantar hold PT eval today, pt on bedrest until tomorrow)    Missed Time: Attempt    Comment: 9:30am

## 2024-09-17 NOTE — OP NOTE
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]     Procedures  Spine Lumbar Tumor Resection  68198 - TN HINSON BX/EXC ISPI JUAN IDRL XMED LUMBAR       Surgeons      * Gabe Thomas - Primary    Resident/Fellow/Other Assistant:  Surgeons and Role:     * Maria Ines Hammer MD - Resident - Assisting     * Kristina Guerrero MD - Resident - Assisting    Procedure Summary  Anesthesia: General  ASA: III  Anesthesia Staff: Anesthesiologist: Jose Cruz Adrian MD; Lexi Menendez MD  CRNA: Logan Pierce, APRN-CRNA  C-AA: FINESSE Lyons  Anesthesia Resident: Lorenzo Bethea MD  SRNA: Adriane Gonzalez  Estimated Blood Loss: 150 mL  Intra-op Medications: Administrations occurring from 1230 to 1410 on 24:  * No intraprocedure medications in log *           Anesthesia Record               Intraprocedure I/O Totals          Intake    Dexmedetomidine 0.00 mL    The total shown is the total volume documented since Anesthesia Start was filed.    Ketamine 0.00 mL    The total shown is the total volume documented since Anesthesia Start was filed.    LR bolus 1800.00 mL    Clevidipine Drip 0.00 mL    The total shown is the total volume documented since Anesthesia Start was filed.    Remifentanil Drip 0.00 mL    The total shown is the total volume documented since Anesthesia Start was filed.    Nitroglycerin Drip 0.00 mL    The total shown is the total volume documented since Anesthesia Start was filed.    Total Intake 1800 mL       Output    Urine 305 mL    Total Output 305 mL       Net    Net Volume 1495 mL          Specimen:   ID Type Source Tests Collected by Time   1 : INTRADURAL TUMOR Tissue BRAIN RESECTION SURGICAL PATHOLOGY EXAM Gabe  MD William PhD 9/16/2024 1716   2 : INTRADURAL TUMOR Tissue BRAIN RESECTION SURGICAL PATHOLOGY EXAM Gabe Thomas MD PhD 9/16/2024 1749        Staff:   Circulator: Olga Bowensub Person: Slime Bowensub Person: Ada Cash Circulator: Vanessa Cash Scrub: Maury Cash Circulator: Sonia Cash Scrub: Marcellus         Drains and/or Catheters:   Closed/Suction Drain 1 Inferior;Medial Back Bulb 7 Fr. (Active)   Site Description Unable to view 09/16/24 2203   Dressing Status Clean;Dry;Occlusive 09/16/24 2203   Drainage Appearance Bloody 09/16/24 2203   Status Other (Comment) 09/16/24 2203   Output (mL) 35 mL 09/16/24 2200       Urethral Catheter Non-latex 16 Fr. (Active)   Site Assessment Clean;Skin intact 09/16/24 2203   Collection Container Standard drainage bag 09/16/24 2203   Securement Method Securing device (Describe) 09/16/24 2203   Output (mL) 650 mL 09/16/24 2200       Implants: none    Informed Consent:  The risks, benefits, complications, and alternatives were discussed with the patient. I have explained the surgical procedure in detail with expected duration and extent of recovery along risks of surgery that include, but is not limited to bleeding, infection, blood vessel injury or damage, loss of sensation, loss of bladder, bowel or sexual function, nerve injury/damage resulting in weakness/paralysis,  CSF leak, brachial plexus injury, peripheral vision blindness, failure to relieve symptoms, recurrent disease, future need for fusion, need to reoperate for any reason and general anesthesia reaction such as stroke, coma, heart attack, delirium, confusion, death as well as worsening of preexisted medical conditions.     I clearly emphasized that while the goal of surgery is to decompress the spinal cord so as to ARREST the progression of neurological deficits - preexisting deficits may or may not improve after surgery. We discussed that many patients do clinically improve in functional and  neurological outcomes following decompression of the spinal elements in patients but the extent of which is variable and depends on the severity of pain, numbness, tingling, or weakness. With improvement seen of those symptoms in that order. We did discuss the goal of surgery to alleviate pain first and foremost and hope for recovery of all neurologic function with time.     All questions were answered and the patient was amenable to proceed.     INDICATIONS FOR THE PROCEDURE: Lynnette Baig is an 44 y.o. 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 She endorses pain and numbness in her left leg, which has made ambulation difficult. Her left TA/EH was slightly weak at 5-/5.  She also endorses some intermittent pelvic numbness.  Further workup found a 3.7 cm lesion, concerning for a schwannoma,  compressing the cauda equina.  The decision was made to remove the lesion this admission to relieve pain and prevent progressive neurological decline.      DESCRIPTION OF THE OPERATION:   The patient was brought to the operating room theater. A verbal huddle was performed confirming the patient by name, date of birth, medical record number, site of surgery. After all team members were in agreement, they underwent anesthesia induction without complication. Two large bore IVs  and an chalo were placed as well as endotracheal tube. Perioperative antibiotic administration was confirmed. The patient was flipped prone onto an open juan luis table and her back was prepped and draped in a sterile fashion. Using lateral fluoroscopy we confirmed our levels of interest and an incision was marked out in the midline.     Intraoperative neuromonitoring of MEPS, SSEPS, and both trigger an stimulation EMG were used throughout the case.     The skin was then infiltrated with local anesthetic and we then began the procedure by opening the skin with a 10 blade and using combination of Bovie electrocautery  and blunt dissection we exposed the spinous process, pars, lamina, and joints. We then used lateral fluoroscopy again to confirm our levels of interest L3-S1 after total exposure and a self-retaining retractor was placed into the incision.     Next we turned our attention to the decompression. To begin, a leksell rongeur and a high speed drill was used to remove the spinous process and lamina exposing the underlying ligamentum flavum. After removal of the bone we used kerrison rongeurs, curettes, and blunt dissection to free the central canal in preparation for our dural opening. Minimal facet removal was performed to maintain mechanical stability. An ultrasound was used to identify the lesion within the dura. Meticulous hemostasis prior to the dural opening was achieved with bovie and bipolar cautery in addition to flowseal.     We then brought in the operating room microscope. Using microsurgical technique we incised the dural with an 11 blade. We carefully opened the dura. We tacked up the dura with 4-0 neurolon suture.     The mass was clearly identified.  The nerve roots were displaced superficial and lateral to the right of the lesion.  The nerve roots were manipulated using Rhoton microdissectors to expose the dorsal aspect of the mass.  A small fascicle of a nerve root appeared to be overlying the mass within a thin capsular layer.  This small fascicle in addition to  the dorsum of the mass did not trigger EMG up to 5 mA.  We then proceeded to microdissecting the small nerve fascicle from the mass  capsule.  Following displacement of the now free fascicle, using bipolar cautery and microscissors we entered the dorsum of the capsule and collected small samples of the lesion with small tumor forceps which were then sent for frozen pathology.  The cystic component of the lesion was entered resulting in sufficient debulking of the lesion size enabling better mobilization of the mass.  Inspection of the mass  borders found no significant nerve root attachments except from the right side of the lesion.  An afferent and efferent nerve root was identified near the cranial and caudal pole of the tumor.  A corresponding root traveled with the affected root but did not enter the mass. EHL was triggered upon stimulation of the corresponding root.  We concluded that the L5 sensory root was the source of the lesion and we proceeded with neurolysis of the intact motor root from the affected sensory root.  We again confirmed that stimulation of the afferent and efferent nerve root resulted in no response.  We then coagulated and cut the nerve root at both the afferent and efferent aspect of the lesion, carefully avoiding the corresponding nerve root.  We then turned our attention to the final nerve root adherent to the capsule.  Stimulation of this nerve root resulted in response of the sphincters.  We carefully micro dissected the root from the lesion.  Further inspection demonstrated that the lesion was free circumferentially from all nerve roots and the mass was excised from the intradural canal.  The intradural space was irrigated several times to remove any blood products.  A watertight closure was achieved with 4-0 Nurolon sutures with no egress of CSF with valsalva challenge.  The dura was dried and a dural sealant was applied.    Next we copiously irrigated the incision and began our closure, the muscle and fascia were approximated with 0 vicryl and 2-0 vicryl sutures, followed by a running subcuticular 4-0 monocryl and exofin skin glue. A 7 flat TRINIDAD drain on thumb suction was left in the subfascial space.     Motors were checked repeatedly throughout the case and remained at baseline.    The frozen showed spindle cells suggestive of schwannoma.  The remain mass was sent for permanent specimen to ensure the final diagnosis. A gross total resection was achieved.     The patient returned to anesthesias care and was extubated  and returned to the PACU in stable condition.     Disposition: PACU - hemodynamically stable.    Condition: stable    Attending Attestation: I was present and scrubbed for the entire procedure.    Gabe Thomas MD, PhD  Attending Neurosurgeon, [facility]   of Neurological Surgery  [facility] [employer] [facility] [employer]  Office: [phone]  Fax: [phone]        FAMILY HISTORY:  Family history of diabetes mellitus, Mother

## 2024-09-18 ENCOUNTER — APPOINTMENT (OUTPATIENT)
Dept: CARDIOLOGY | Facility: HOSPITAL | Age: 44
DRG: 519 | End: 2024-09-18
Payer: COMMERCIAL

## 2024-09-18 PROCEDURE — 2500000005 HC RX 250 GENERAL PHARMACY W/O HCPCS: Performed by: STUDENT IN AN ORGANIZED HEALTH CARE EDUCATION/TRAINING PROGRAM

## 2024-09-18 PROCEDURE — 2500000002 HC RX 250 W HCPCS SELF ADMINISTERED DRUGS (ALT 637 FOR MEDICARE OP, ALT 636 FOR OP/ED): Performed by: PHYSICIAN ASSISTANT

## 2024-09-18 PROCEDURE — 2500000004 HC RX 250 GENERAL PHARMACY W/ HCPCS (ALT 636 FOR OP/ED): Performed by: STUDENT IN AN ORGANIZED HEALTH CARE EDUCATION/TRAINING PROGRAM

## 2024-09-18 PROCEDURE — 2500000002 HC RX 250 W HCPCS SELF ADMINISTERED DRUGS (ALT 637 FOR MEDICARE OP, ALT 636 FOR OP/ED)

## 2024-09-18 PROCEDURE — 93005 ELECTROCARDIOGRAM TRACING: CPT

## 2024-09-18 PROCEDURE — 2500000001 HC RX 250 WO HCPCS SELF ADMINISTERED DRUGS (ALT 637 FOR MEDICARE OP): Performed by: STUDENT IN AN ORGANIZED HEALTH CARE EDUCATION/TRAINING PROGRAM

## 2024-09-18 PROCEDURE — 1100000001 HC PRIVATE ROOM DAILY

## 2024-09-18 PROCEDURE — 2500000004 HC RX 250 GENERAL PHARMACY W/ HCPCS (ALT 636 FOR OP/ED)

## 2024-09-18 PROCEDURE — 2500000004 HC RX 250 GENERAL PHARMACY W/ HCPCS (ALT 636 FOR OP/ED): Performed by: PHYSICIAN ASSISTANT

## 2024-09-18 PROCEDURE — 93010 ELECTROCARDIOGRAM REPORT: CPT | Performed by: STUDENT IN AN ORGANIZED HEALTH CARE EDUCATION/TRAINING PROGRAM

## 2024-09-18 RX ORDER — BUPRENORPHINE AND NALOXONE 2; .5 MG/1; MG/1
0.5 FILM, SOLUBLE BUCCAL; SUBLINGUAL 2 TIMES DAILY
Status: DISCONTINUED | OUTPATIENT
Start: 2024-09-20 | End: 2024-09-18

## 2024-09-18 RX ORDER — BUPRENORPHINE AND NALOXONE 2; .5 MG/1; MG/1
1 FILM, SOLUBLE BUCCAL; SUBLINGUAL 2 TIMES DAILY
Status: DISCONTINUED | OUTPATIENT
Start: 2024-09-21 | End: 2024-09-18

## 2024-09-18 RX ORDER — HYDROMORPHONE HYDROCHLORIDE 1 MG/ML
0.2 INJECTION, SOLUTION INTRAMUSCULAR; INTRAVENOUS; SUBCUTANEOUS EVERY 6 HOURS PRN
Status: DISCONTINUED | OUTPATIENT
Start: 2024-09-18 | End: 2024-09-18

## 2024-09-18 RX ORDER — HYDROMORPHONE HYDROCHLORIDE 1 MG/ML
0.2 INJECTION, SOLUTION INTRAMUSCULAR; INTRAVENOUS; SUBCUTANEOUS EVERY 4 HOURS PRN
Status: DISCONTINUED | OUTPATIENT
Start: 2024-09-18 | End: 2024-09-20 | Stop reason: HOSPADM

## 2024-09-18 RX ORDER — BUPRENORPHINE AND NALOXONE 2; .5 MG/1; MG/1
0.25 FILM, SOLUBLE BUCCAL; SUBLINGUAL 2 TIMES DAILY
Status: DISCONTINUED | OUTPATIENT
Start: 2024-09-19 | End: 2024-09-18

## 2024-09-18 RX ORDER — BUPRENORPHINE AND NALOXONE 2; .5 MG/1; MG/1
0.25 FILM, SOLUBLE BUCCAL; SUBLINGUAL ONCE
Status: COMPLETED | OUTPATIENT
Start: 2024-09-18 | End: 2024-09-18

## 2024-09-18 RX ORDER — BUPRENORPHINE AND NALOXONE 2; .5 MG/1; MG/1
0.5 FILM, SOLUBLE BUCCAL; SUBLINGUAL 2 TIMES DAILY
Status: COMPLETED | OUTPATIENT
Start: 2024-09-19 | End: 2024-09-19

## 2024-09-18 RX ORDER — BUPRENORPHINE AND NALOXONE 2; .5 MG/1; MG/1
1 FILM, SOLUBLE BUCCAL; SUBLINGUAL 2 TIMES DAILY
Status: DISCONTINUED | OUTPATIENT
Start: 2024-09-20 | End: 2024-09-20 | Stop reason: HOSPADM

## 2024-09-18 ASSESSMENT — PAIN SCALES - GENERAL
PAINLEVEL_OUTOF10: 10 - WORST POSSIBLE PAIN
PAINLEVEL_OUTOF10: 0 - NO PAIN
PAINLEVEL_OUTOF10: 7
PAINLEVEL_OUTOF10: 8
PAINLEVEL_OUTOF10: 8

## 2024-09-18 ASSESSMENT — PAIN DESCRIPTION - LOCATION
LOCATION: BACK
LOCATION: BACK

## 2024-09-18 ASSESSMENT — PAIN - FUNCTIONAL ASSESSMENT: PAIN_FUNCTIONAL_ASSESSMENT: 0-10

## 2024-09-18 ASSESSMENT — PAIN DESCRIPTION - ORIENTATION: ORIENTATION: MID;LOWER

## 2024-09-18 ASSESSMENT — LIFESTYLE VARIABLES
TOTAL_SCORE: 3
TOTAL_SCORE: 1

## 2024-09-18 NOTE — CARE PLAN
The patient's goals for the shift include      The clinical goals for the shift include Pt will have adequate pain control throughout shift.    Over the shift, the patient did   Problem: Pain  Goal: Turns in bed with improved pain control throughout the shift  Outcome: Progressing     Problem: Pain  Goal: Free from opioid side effects throughout the shift  Outcome: Progressing     Problem: Fall/Injury  Goal: Verbalize understanding of personal risk factors for fall in the hospital  Outcome: Progressing     Problem: Fall/Injury  Goal: Use assistive devices by end of the shift  Outcome: Progressing   make progress toward the following goals.

## 2024-09-18 NOTE — PROGRESS NOTES
"PSYCHIATRY CONSULT-LIAISON PROGRESS NOTE    SUBJECTIVE  Lynnette Baig is a 44 y.o. female with a past psychiatric history of depression, substance abuse and a past medical history of HTN and chronic back pain who was admitted to [facility] on 9/13/24 for acute exacerbation of LBP and LLE radiculopathy. Psychiatry was consulted on 9/16/24 for chemical dependency assessment of opioid withdrawal.    On interview today, patient reported that her sleep continues to be suboptimal in the hospital (i.e., ~3 hours total, she is frequently being woken in middle of night by care team) but denied insomnia. Her appetite is limited by her current medical condition (i.e., required to lay flat in bed). She was calm and coherent, able to answer questions appropriately. She expressed that she is eager to be able to sit upright. She reiterated her concerns about continuity of care for chemical dependency following discharge, due to her transportation barrier.    OBJECTIVE    VITALS      9/17/2024     8:26 AM 9/17/2024    11:59 AM 9/17/2024     3:55 PM 9/17/2024     8:55 PM 9/18/2024    12:17 AM 9/18/2024     3:52 AM 9/18/2024     7:44 AM   Vitals   Systolic 145 132 131 134 128 126 125   Diastolic 85 80 67 80 82 82 82   Heart Rate 105 105 117 114 98 98 94   Temp 37 °C (98.6 °F) 37 °C (98.6 °F) 36.6 °C (97.9 °F) 35.8 °C (96.4 °F) 35.8 °C (96.4 °F) 36.1 °C (97 °F) 35.5 °C (95.9 °F)   Resp 18 18 18 18 18 18 18        LABS  No results found for this or any previous visit (from the past 24 hour(s)).         MENTAL STATUS EXAM  General: No acute distress, laying in bed comfortably during interview.  Appearance:  female appearing stated age, appropriately dressed/groomed.  Attitude: Calm, in good spirits.  Behavior: Cooperative, appropriate eye contact, .  Motor Activity: No psychomotor agitation/retardation, no tics noted.  Speech: Normal rate/rhythm/volume.  Mood: \"In pain but recovering\".  Affect: Full range, mood " congruence.  Thought Process: Linear, goal-directed, organized.  Thought Content: Denied current suicidal ideation. Denied homicidal ideation. No delusions elicited.  Perception: Denied visual hallucinations. Denied auditory hallucinations. Did not appear to be internally stimulated.  Cognition: Alert and oriented to person, place, time. No deficits in attention, concentration or language. Able to answer questions appropriately throughout interview, and memory appeared intact with adequate fund of knowledge.  Insight: Appropriate understanding of current circumstances.  Judgement: Intact, able to make reasonable decisions about ordinary activities of daily living and medical care.     Scheduled medications  acetaminophen, 975 mg, oral, q6h CORI  buprenorphine-naloxone, 0.25 Film, sublingual, Once  [START ON 9/19/2024] buprenorphine-naloxone, 0.5 Film, sublingual, BID  [START ON 9/20/2024] buprenorphine-naloxone, 1 Film, sublingual, BID  cyclobenzaprine, 10 mg, oral, TID  heparin (porcine), 5,000 Units, subcutaneous, q8h  ketorolac, 30 mg, intravenous, q6h CORI  lidocaine, 2 patch, transdermal, Daily  lisinopril 20 mg, hydroCHLOROthiazide 25 mg for Zestoretic/Prinizide, , oral, Nightly  polyethylene glycol, 17 g, oral, Daily  sennosides-docusate sodium, 2 tablet, oral, BID      Continuous medications  sodium chloride 0.9%, 75 mL/hr, Last Rate: 75 mL/hr (09/18/24 0634)      PRN medications  PRN medications: cloNIDine, dicyclomine, HYDROmorphone, hydrOXYzine HCL, naloxone, ondansetron, oxyCODONE, oxyCODONE, oxyCODONE, traZODone    No results found for this or any previous visit (from the past 24 hour(s)).        PSYCHIATRIC RISK ASSESSMENT  Acute Risk of Harm to Others is Considered: Low  Acute Risk of Harm to Self is Considered: Low      ASSESSMENT AND PLAN  Lynnette Baig is a 44 y.o. female on day 4 of admission, with a past psychiatric history of depression, substance abuse and a past medical history of HTN and  "chronic back pain, who was admitted to [facility] on 9/13/24 for acute exacerbation of LBP and LLE radiculopathy and surgical resection of intradural extramedullary spinal tumor (schwannoma) in the lumbar spine (at L4-L5). Psychiatry was consulted on 9/16/24 for chemical dependency assessment of heroin withdrawal.     On initial assessment, urine toxicology screen on admission was positive for amphetamines, opiates, fentanyl, oxycodone, cocaine. Patient reported that she has been using heroin and oxycodone on a daily basis prior to her hospitalization. She stated that in the past she has experienced multiple symptoms of withdrawal, including nausea, vomiting (i.e., dry heaving), agitation, and formication. She reported that her sleep has not been great in the hospital (i.e., 4 hours total, waking in middle of night) but denied insomnia. She endorsed past thoughts of self-harm but denied homicidal thoughts, AH/VH, psychosis symptoms.  She was calm and in good spirits, especially when discussing her hobbies (art, sketching, painting) which she stated help with her mood. She expressed interest in art therapy. Post-op patient receiving oxycodone for pain management is not currently exhibiting opioid withdrawal symptoms.       Update 09/18/24: pt reports doing ok though sleep limited by care. Did not receive suboxone but remains open to microinduction. Will start today with below titration    Impression:  Opioid Use Disorder  Depression by hx    Recommendations  Safety:  - Patient does not currently meet criteria for inpatient psychiatric admission.   - To evaluate decision-making capacity, recommend use of the Capacity Evaluation Tool. Search “[facility] Capacity Evaluation under SmartText\"   - Patient does not require a 1:1 sitter from a psychiatric perspective at this time.  - Defer to primary team decision for 1:1 sitter for safety precautions.  - As with all hospitalized patients, would recommend delirium precautions, as " below.    Work-up:  - EKG (9/18/24)QTC 445ms  - Defer to primary team for continued monitoring of QTc interval    Ancillary Services:  - Recommend art therapy consult.     Medications:  - Please order Suboxone (2-0.5)   1/4 film SL daily today 9/18  1/4 film SL BID on 9/19  1/2 film SL BID on 9/20   1 full film SL BID on 9/21    Medication Consent: n/a; consult service    Follow-up:  -follow-up with patient on transportation barrier for continuity of care (i.e., identifying chemical dependency treatment center with transportation assistance program).   - Patient will benefit from outpatient chemical dependency service. Patient will be given list of suitable outpatient chemical dependency resources prior to discharge .    ==========   - Discussed recommendations with primary team.  - Psychiatry will continue to follow.    Brenda Saldivar, Ph.D.  Medical Student, MS-3  [facility]    -----------------------------------------------------------------  Senior Resident Addendum:  I examined & discussed the patient above. I have reviewed and agree with the excellent note above.     Marvin Sheehan MD (Paul)

## 2024-09-18 NOTE — PROGRESS NOTES
Lynnette Baig is a 44 y.o. female on day 4 of admission presenting with Intradural extramedullary spinal tumor.    Subjective   No events overnight. Patient reports pain controlled and improvement in LLE radiculopathy     Objective     Physical Exam  Awake, Ox3  RUE D5 B5 T5 HG/IO 5  RLE HF5 KE5 DF/PF 5  LUE D5 B5 T5 HG/IO 5  LLE HF5 KE5 DF/PF 5  Incision c/d/I   Drain in place w/ serosang output   SILT     Last Recorded Vitals  Blood pressure 126/82, pulse 98, temperature 36.1 °C (97 °F), temperature source Temporal, resp. rate 18, height 1.524 m (5'), weight 87.8 kg (193 lb 9 oz), SpO2 100%.  Intake/Output last 3 Shifts:  I/O last 3 completed shifts:  In: 1963.3 (22.4 mL/kg) [I.V.:163.3 (1.9 mL/kg); IV Piggyback:1800]  Out: 2290 (26.1 mL/kg) [Urine:2120 (0.7 mL/kg/hr); Drains:170]  Weight: 87.8 kg     Relevant Results  Lab Results   Component Value Date    WBC 13.9 (H) 09/17/2024    HGB 10.1 (L) 09/17/2024    HCT 32.8 (L) 09/17/2024    MCV 86 09/17/2024     09/17/2024     Lab Results   Component Value Date    GLUCOSE 124 (H) 09/17/2024    CALCIUM 8.9 09/17/2024     09/17/2024    K 3.3 (L) 09/17/2024    CO2 27 09/17/2024     09/17/2024    BUN 17 09/17/2024    CREATININE 0.81 09/17/2024     Assessment/Plan   Assessment & Plan  Intradural extramedullary spinal tumor    HTN (hypertension)    Lynnette Baig is a 44 y.o. female with h/o HTN, vit D deficiency, p/w LBP w LLE radiculopathy, MRI T/L 3.7 cm L4-L5 intradural lesion     9/16 s/p L3-5 lami for intradural tumor resection (schwannoma)     Plan  Floor  Maintain drain, monitor output   Flat until this morning then can relax head of bed restrictions  Ok to work with PTOT today  Chemical dependency recs - Suboxone dosing, con't full agonist opioid regimen  SCD, SQH             Sachin Britton MD

## 2024-09-18 NOTE — PROGRESS NOTES
Transitional Care Coordination Progress Note:  Patient was discussed during interdisciplinary rounds.  Plan per surgical team: Flat in bed s/p tumor resection. PT and OT evals pending  Payer: Maureen  Status: Inpatient  Discharge disposition: therapy recs pending  Potential barriers: none  ADOD: 1-2 days  Miriam Sosa RN

## 2024-09-18 NOTE — PROGRESS NOTES
Physical Therapy Communication:     PT orders received, chart reviewed. Per RN, MD personally called her to advise patient is still on bedrest with HOB flat restrictions this AM due to high drain output. Will re-attempt as able/appropriate.    Shelly Harper, PT, DPT

## 2024-09-19 LAB
ALBUMIN SERPL BCP-MCNC: 3.2 G/DL (ref 3.4–5)
ANION GAP SERPL CALC-SCNC: 10 MMOL/L (ref 10–20)
BUN SERPL-MCNC: 14 MG/DL (ref 6–23)
CALCIUM SERPL-MCNC: 8.8 MG/DL (ref 8.6–10.6)
CHLORIDE SERPL-SCNC: 100 MMOL/L (ref 98–107)
CO2 SERPL-SCNC: 30 MMOL/L (ref 21–32)
CREAT SERPL-MCNC: 0.62 MG/DL (ref 0.5–1.05)
EGFRCR SERPLBLD CKD-EPI 2021: >90 ML/MIN/1.73M*2
ERYTHROCYTE [DISTWIDTH] IN BLOOD BY AUTOMATED COUNT: 14.5 % (ref 11.5–14.5)
GLUCOSE SERPL-MCNC: 88 MG/DL (ref 74–99)
HCT VFR BLD AUTO: 34.5 % (ref 36–46)
HGB BLD-MCNC: 10.4 G/DL (ref 12–16)
MCH RBC QN AUTO: 26.5 PG (ref 26–34)
MCHC RBC AUTO-ENTMCNC: 30.1 G/DL (ref 32–36)
MCV RBC AUTO: 88 FL (ref 80–100)
NRBC BLD-RTO: 0 /100 WBCS (ref 0–0)
PHOSPHATE SERPL-MCNC: 3.8 MG/DL (ref 2.5–4.9)
PLATELET # BLD AUTO: 339 X10*3/UL (ref 150–450)
POTASSIUM SERPL-SCNC: 3.8 MMOL/L (ref 3.5–5.3)
RBC # BLD AUTO: 3.93 X10*6/UL (ref 4–5.2)
SODIUM SERPL-SCNC: 136 MMOL/L (ref 136–145)
WBC # BLD AUTO: 9.8 X10*3/UL (ref 4.4–11.3)

## 2024-09-19 PROCEDURE — 80069 RENAL FUNCTION PANEL: CPT | Performed by: PHYSICIAN ASSISTANT

## 2024-09-19 PROCEDURE — 2500000004 HC RX 250 GENERAL PHARMACY W/ HCPCS (ALT 636 FOR OP/ED)

## 2024-09-19 PROCEDURE — 97530 THERAPEUTIC ACTIVITIES: CPT | Mod: GP

## 2024-09-19 PROCEDURE — 1100000001 HC PRIVATE ROOM DAILY

## 2024-09-19 PROCEDURE — 36415 COLL VENOUS BLD VENIPUNCTURE: CPT | Performed by: PHYSICIAN ASSISTANT

## 2024-09-19 PROCEDURE — 2500000001 HC RX 250 WO HCPCS SELF ADMINISTERED DRUGS (ALT 637 FOR MEDICARE OP): Performed by: STUDENT IN AN ORGANIZED HEALTH CARE EDUCATION/TRAINING PROGRAM

## 2024-09-19 PROCEDURE — 2500000002 HC RX 250 W HCPCS SELF ADMINISTERED DRUGS (ALT 637 FOR MEDICARE OP, ALT 636 FOR OP/ED)

## 2024-09-19 PROCEDURE — 2500000005 HC RX 250 GENERAL PHARMACY W/O HCPCS: Performed by: STUDENT IN AN ORGANIZED HEALTH CARE EDUCATION/TRAINING PROGRAM

## 2024-09-19 PROCEDURE — 97530 THERAPEUTIC ACTIVITIES: CPT | Mod: GO

## 2024-09-19 PROCEDURE — 97161 PT EVAL LOW COMPLEX 20 MIN: CPT | Mod: GP

## 2024-09-19 PROCEDURE — 85027 COMPLETE CBC AUTOMATED: CPT | Performed by: PHYSICIAN ASSISTANT

## 2024-09-19 PROCEDURE — 2500000004 HC RX 250 GENERAL PHARMACY W/ HCPCS (ALT 636 FOR OP/ED): Performed by: STUDENT IN AN ORGANIZED HEALTH CARE EDUCATION/TRAINING PROGRAM

## 2024-09-19 PROCEDURE — 2500000004 HC RX 250 GENERAL PHARMACY W/ HCPCS (ALT 636 FOR OP/ED): Performed by: PHYSICIAN ASSISTANT

## 2024-09-19 PROCEDURE — 97165 OT EVAL LOW COMPLEX 30 MIN: CPT | Mod: GO

## 2024-09-19 ASSESSMENT — PAIN DESCRIPTION - DESCRIPTORS: DESCRIPTORS: SORE

## 2024-09-19 ASSESSMENT — ACTIVITIES OF DAILY LIVING (ADL)
BATHING_ASSISTANCE: MINIMAL
ADL_ASSISTANCE: INDEPENDENT
ADL_ASSISTANCE: INDEPENDENT

## 2024-09-19 ASSESSMENT — COGNITIVE AND FUNCTIONAL STATUS - GENERAL
HELP NEEDED FOR BATHING: A LITTLE
DAILY ACTIVITIY SCORE: 21
TURNING FROM BACK TO SIDE WHILE IN FLAT BAD: A LITTLE
PERSONAL GROOMING: A LITTLE
CLIMB 3 TO 5 STEPS WITH RAILING: A LOT
HELP NEEDED FOR BATHING: A LITTLE
TOILETING: A LITTLE
EATING MEALS: A LITTLE
DRESSING REGULAR LOWER BODY CLOTHING: A LITTLE
DRESSING REGULAR UPPER BODY CLOTHING: A LITTLE
DAILY ACTIVITIY SCORE: 18
MOVING TO AND FROM BED TO CHAIR: A LITTLE
STANDING UP FROM CHAIR USING ARMS: A LITTLE
WALKING IN HOSPITAL ROOM: A LITTLE
CLIMB 3 TO 5 STEPS WITH RAILING: A LOT
MOBILITY SCORE: 18
TOILETING: A LITTLE
MOBILITY SCORE: 22
DRESSING REGULAR LOWER BODY CLOTHING: A LITTLE

## 2024-09-19 ASSESSMENT — PAIN - FUNCTIONAL ASSESSMENT
PAIN_FUNCTIONAL_ASSESSMENT: 0-10

## 2024-09-19 ASSESSMENT — PAIN SCALES - GENERAL
PAINLEVEL_OUTOF10: 7
PAINLEVEL_OUTOF10: 7
PAINLEVEL_OUTOF10: 4
PAINLEVEL_OUTOF10: 4
PAINLEVEL_OUTOF10: 3

## 2024-09-19 ASSESSMENT — PAIN DESCRIPTION - ORIENTATION: ORIENTATION: MID;LOWER

## 2024-09-19 ASSESSMENT — PAIN DESCRIPTION - LOCATION: LOCATION: BACK

## 2024-09-19 NOTE — PROGRESS NOTES
Lynnette Baig is a 44 y.o. female on day 5 of admission presenting with Intradural extramedullary spinal tumor.    Subjective   NAOE. Patient complaining of bifrontal headache.       Objective     Physical Exam    AOx3        RUE D5 / B5 / T5 / HG 5/ IO 5       LUE D5 / B5 / T5 / HG 5/ IO 5       Negative juarez          RLE HF5 / KE 5/ DF 5/ PF 5       LLE HF5 / KE 5/ DF 5/ PF 5       No clonus       SENSORY:  LLE radiculopathy      Drain with some continued leakage.      Last Recorded Vitals  Blood pressure 141/84, pulse 87, temperature 36.1 °C (97 °F), resp. rate 19, height 1.524 m (5'), weight 87.8 kg (193 lb 9 oz), SpO2 96%.  Intake/Output last 3 Shifts:  I/O last 3 completed shifts:  In: 1460 (16.6 mL/kg) [P.O.:20; I.V.:1440 (16.4 mL/kg)]  Out: 4200 (47.8 mL/kg) [Urine:4050 (1.3 mL/kg/hr); Drains:150]  Weight: 87.8 kg     Relevant Results                              Assessment/Plan   Assessment & Plan  Intradural extramedullary spinal tumor    HTN (hypertension)    Lynnette Baig is a 44 y.o. female with h/o HTN, vit D deficiency, p/w LBP w LLE radiculopathy, MRI T/L 3.7 cm L4-L5 intradural lesion     9/16 s/p L3-5 lami for intradural tumor resection (schwannoma)     Plan  Floor  Maintain drain  Continue to lay flat  PTOT  SCDs           Miguel Martin MD

## 2024-09-19 NOTE — PROGRESS NOTES
Physical Therapy    Physical Therapy Evaluation & Treatment    Patient Name: Lynnette Baig  MRN: [id number]  Department: [facility] [address]  Room: [address] [place] [address]  Today's Date: 9/19/2024   Time Calculation  Start Time: 1006  Stop Time: 1035  Time Calculation (min): 29 min    Assessment/Plan   PT Assessment  PT Assessment Results: Decreased strength, Decreased range of motion, Impaired balance, Decreased mobility, Pain, Orthopedic restrictions  Rehab Prognosis: Good  Evaluation/Treatment Tolerance: Patient tolerated treatment well  Medical Staff Made Aware: Yes  Strengths: Ability to acquire knowledge  Barriers to Participation: Comorbidities, Coping skills  End of Session Communication: Bedside nurse  Assessment Comment: 44yr F patient presents s/p L3-5 laminectomy for intradural tumor resection. During examination patient has good concept of and maintaining precautions. demonstrates deficits in strength, mobility, function, gait, and balance. Able to progress during session from FWRW to no device with ambulation.  She is functioning below baseline and would benefit from continued skilled PT while admitted. At this time recommending low intensity rehab at the time of DC with 24/7 assist for patient safety.   End of Session Patient Position: Bed, 3 rail up, Alarm on (HOB flat)   IP OR SWING BED PT PLAN  Inpatient or Swing Bed: Inpatient  PT Plan  Treatment/Interventions: Bed mobility, Transfer training, Gait training, Stair training, Balance training, Neuromuscular re-education, Strengthening, Endurance training, Range of motion, Therapeutic exercise, Therapeutic activity, Home exercise program, Positioning, Postural re-education  PT Plan: Ongoing PT  PT Frequency: Daily  PT Discharge Recommendations: Low intensity level of continued care  Equipment Recommended upon Discharge:  (Shower Chair with Back)  PT Recommended Transfer Status: Assist x1, Assistive device  PT - OK to Discharge: Yes (Once medically stable)      Subjective      General Visit Information:  General  Reason for Referral: Admitted 9/13 with back pain radiating to legs (L>R, inability to ambulate; 2 previous falls/injuries); 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; 9/16 s/p L3-5 laminectomy for intradural tumor resection; per neuro note 9/17: Flat until 9/18 AM  Past Medical History Relevant to Rehab: psychiatric history of depression, substance abuse, HTN and chronic back pain, vit D Deficiency  Prior to Session Communication: Physician, Bedside nurse (Per MD - able to mobilize OOB and clamp drain)  Patient Position Received: Bed, 3 rail up, Alarm on  General Comment: Pt supine flat at the start of session with RN in room clamping drain for mobility; patient is agreeable to work with PT  Home Living:  Home Living  Type of Home: House  Lives With: Spouse, Adult children ( and 21yr old son)  Home Adaptive Equipment: Walker rolling or standard, Adaptive bed, Other (Comment) (Hand held reacher)  Home Layout: Multi-level, Laundry main level, Stairs to alternate level with rails, Able to live on main level with bedroom/bathroom  Alternate Level Stairs-Rails:  (1)  Alternate Level Stairs-Number of Steps: 14 up to the bedroom  Home Access: Stairs to enter with rails  Entrance Stairs-Rails:  (1 (for the first 3 steps))  Entrance Stairs-Number of Steps: 3+2  Bathroom Shower/Tub: Walk-in shower, Tub/shower unit  Bathroom Toilet: Standard  Bathroom Equipment: Built-in shower seat, Hand-held shower hose, Bedside commode  Bathroom Accessibility: Upstairs shower: WIS with built-in shower seat, HHS / Downstairs shower: tub shower, HHS  Home Living Comments: Pt has first floor set up and adjustable bed  Prior Level of Function:  Prior Function Per Pt/Caregiver Report  Level of [place]: Independent with ADLs and functional transfers, Independent with homemaking with ambulation  Receives Help From: Family, Other (Comment) ( and son assist as needed)  ADL  Assistance: Independent  Homemaking Assistance: Independent  Ambulatory Assistance: Independent  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, Other (Comment) (As a cosmotologist)  Leisure: Enjoys art  Hand Dominance: Right  Prior Function Comments: Prior to July IND in all aspects after July when her function started to decline she required assistance; reports multiple falls over the last 2 yrs; states  and son are able to be home and assist at home  Precautions:  Precautions  Medical Precautions: Fall precautions, Spinal precautions  Post-Surgical Precautions: Spinal precautions    Vitals:   Pre: 136/82mmhg; 102bpm; 99%SpO2  Post: 136/74mmhg; 108bpm; 92%  SpO2     Objective   Pain:  Pain Assessment  Pain Assessment: 0-10  0-10 (Numeric) Pain Score: 4  Pain Type: Surgical pain  Pain Location: Incision  Pain Orientation: Mid, Lower  Pain Descriptors: Sore  Cognition:  Cognition  Overall Cognitive Status: Within Functional Limits  Orientation Level: Oriented X4  Following Commands: Follows all commands and directions without difficulty  Safety Judgment: Good awareness of safety precautions    General Assessments:  General Observation  General Observation:  (Drain intact; incision and patches intact)    Activity Tolerance  Endurance: Endurance does not limit participation in activity    Sensation  Light Touch: No apparent deficits (LE dermatomes intact)      Perception  Inattention/Neglect: Appears intact  Initiation: Appears intact  Motor Planning: Appears intact  Perseveration: Not present      Coordination  Movements are Fluid and Coordinated: Yes  Finger to Nose: Intact  Rapid Alternating Movements: Intact  Alternating Toe Taps: Intact  Heel to [place]: Intact  Finger to Target: Intact  Coordination Comment: Opposition WFL    Postural Control  Postural Control: Within Functional Limits  Posture Comment: No BLT - good posture at EOB with no back support; slight rounded shoulders    Static Sitting Balance  Static Sitting-Balance  Support: Feet supported, Bilateral upper extremity supported  Static Sitting-Level of Assistance: Close supervision  Dynamic Sitting Balance  Dynamic Sitting-Balance Support: Feet supported, Bilateral upper extremity supported  Dynamic Sitting-Level of Assistance: Close supervision  Dynamic Sitting-Balance: Trunk control activities  Dynamic Sitting-Comments: Dynamic coordination and ROM testing    Static Standing Balance  Static Standing-Balance Support: Bilateral upper extremity supported, No upper extremity supported  Static Standing-Level of Assistance: Contact guard  Static Standing-Comment/Number of Minutes: 1 trial at [facility] with no device; and 1 trial at [facility] with FWRW  Dynamic Standing Balance  Dynamic Standing-Balance Support: No upper extremity supported, Bilateral upper extremity supported  Dynamic Standing-Level of Assistance: Contact guard  Dynamic Standing-Balance: Turning  Dynamic Standing-Comments: 1 trial at [facility] with no device; and 1 trial at [facility] with FWRW  Functional Assessments:  Bed Mobility  Bed Mobility: Yes  Bed Mobility 1  Bed Mobility 1: Supine to sitting, Sitting to supine, Log roll  Level of Assistance 1: Contact guard  Bed Mobility Comments 1: VC for proper sequencing/positioning    Transfers  Transfer: Yes  Transfer 1  Transfer From 1: Sit to, Stand to  Transfer to 1: Sit, Stand  Technique 1: Sit to stand, Stand to sit  Transfer Device 1: Walker  Transfer Level of Assistance 1: Contact guard, Minimal verbal cues  Trials/Comments 1: VC for sequencing/positioning  Transfers 2  Transfer From 2: Sit to, Stand to  Transfer to 2: Sit, Stand  Technique 2: Sit to stand, Stand to sit  Transfer Level of Assistance 2: Close supervision  Trials/Comments 2: 1 trial    Ambulation/Gait Training  Ambulation/Gait Training Performed: Yes  Ambulation/Gait Training 1  Surface 1: Level tile  Device 1: Rolling walker  Assistance 1: Contact guard  Quality of Gait 1: Diminished heel strike, Decreased step length,  Antalgic (decreased danny, decreased step length)  Comments/Distance (ft) 1: 2x10'  Ambulation/Gait Training 2  Surface 2: Level tile  Device 2: No device  Assistance 2: Contact guard  Quality of Gait 2: Diminished heel strike, Decreased step length, Antalgic (Diminished heal strike)    Stairs  Stairs: No  Extremity/Trunk Assessments:  Lumbar Spine   Lumbar Spine : Exceptions to Funtional Limits  Lumbar Spine Comment: NO BLT    RUE   RUE : Within Functional Limits  LUE   LUE: Within Functional Limits  RLE   RLE : Exceptions to WFL  AROM RLE (degrees)  RLE AROM Comment: WFL  Strength RLE  RLE Overall Strength: Greater than or equal to 3/5 as evidenced by functional mobility, Deficits, Due to  precautions  LLE   LLE : Exceptions to WFL  AROM LLE (degrees)  LLE AROM Comment: WFL  Strength LLE  LLE Overall Strength: Greater than or equal to 3/5 as evidenced by functional mobility, Deficits, Due to  precautions  Treatments:  Therapeutic Activity  Therapeutic Activity Performed: Yes  Therapeutic Activity 1: Functional mobility via performance of bed mobility and transfers while maintaining precautions  Therapeutic Activity 2: Functional performance via gait - VC for proper sequencing/positioning  Therapeutic Activity 3: Edu on PT POC, benefits, DC planning; spinal precautions    Bed Mobility  Bed Mobility: Yes  Bed Mobility 1  Bed Mobility 1: Supine to sitting, Sitting to supine, Log roll  Level of Assistance 1: Contact guard  Bed Mobility Comments 1: VC for proper sequencing/positioning    Ambulation/Gait Training  Ambulation/Gait Training Performed: Yes  Ambulation/Gait Training 1  Surface 1: Level tile  Device 1: Rolling walker  Assistance 1: Contact guard  Quality of Gait 1: Diminished heel strike, Decreased step length, Antalgic (decreased danny, decreased step length)  Comments/Distance (ft) 1: 2x10'  Ambulation/Gait Training 2  Surface 2: Level tile  Device 2: No device  Assistance 2: Contact guard  Quality of  Gait 2: Diminished heel strike, Decreased step length, Antalgic (Diminished heal strike)  Transfers  Transfer: Yes  Transfer 1  Transfer From 1: Sit to, Stand to  Transfer to 1: Sit, Stand  Technique 1: Sit to stand, Stand to sit  Transfer Device 1: Walker  Transfer Level of Assistance 1: Contact guard, Minimal verbal cues  Trials/Comments 1: VC for sequencing/positioning  Transfers 2  Transfer From 2: Sit to, Stand to  Transfer to 2: Sit, Stand  Technique 2: Sit to stand, Stand to sit  Transfer Level of Assistance 2: Close supervision  Trials/Comments 2: 1 trial  Outcome Measures:  [facility] Basic Mobility  Turning from your back to your side while in a flat bed without using bedrails: None  Moving from lying on your back to sitting on the side of a flat bed without using bedrails: A little  Moving to and from bed to chair (including a wheelchair): A little  Standing up from a chair using your arms (e.g. wheelchair or bedside chair): A little  To walk in hospital room: A little  Climbing 3-5 steps with railing: A lot  Basic Mobility - Total Score: 18    Encounter Problems       Encounter Problems (Active)       PT Problem       Stairs: pt will ascend/descend 4-6 steps in reciprocal pattern with proper gait mechanics and use of HR to promote functional mobility and improve functional performance.  (IND)  (Progressing)       Start:  09/19/24    Expected End:  10/03/24            Gait: Pt will ambulate 100+ft with no device at independent with no LOB.   (Progressing)       Start:  09/19/24    Expected End:  10/03/24            Bed Mobility:  Patient will perform bed mobility at independent to improve functional mobility and maximize patient safety.    (Progressing)       Start:  09/19/24    Expected End:  10/03/24            Transfers: Patient will perform functional transfers at independent to improve functional mobility and maximize patient safety.    (Progressing)       Start:  09/19/24    Expected End:  10/03/24                    Education Documentation  Handouts, taught by Jodi Dang PT at 9/19/2024  3:37 PM.  Learner: Patient  Readiness: Acceptance  Method: Explanation, Demonstration, Handout  Response: Verbalizes Understanding, Demonstrated Understanding    Precautions, taught by Jodi Dang PT at 9/19/2024  3:37 PM.  Learner: Patient  Readiness: Acceptance  Method: Explanation, Demonstration, Handout  Response: Verbalizes Understanding, Demonstrated Understanding    Body Mechanics, taught by Jodi Dang PT at 9/19/2024  3:37 PM.  Learner: Patient  Readiness: Acceptance  Method: Explanation, Demonstration, Handout  Response: Verbalizes Understanding, Demonstrated Understanding    Mobility Training, taught by Jodi Dang PT at 9/19/2024  3:37 PM.  Learner: Patient  Readiness: Acceptance  Method: Explanation, Demonstration, Handout  Response: Verbalizes Understanding, Demonstrated Understanding    Education Comments  No comments found.      09/19/24 at 3:39 PM - Jodi Dang PT

## 2024-09-19 NOTE — PROGRESS NOTES
09/19/24 1539   Discharge Planning   Expected Discharge Disposition Home H  ([facility])     PT recommends low intensity therapy. Spoke to patient about her options for therapoy. She prefers to use  Home Care due to transportation issues.

## 2024-09-19 NOTE — PROGRESS NOTES
Occupational Therapy    Evaluation and Treatment    Patient Name: Lynnette Baig  MRN: [id number]  Today's Date: 9/19/2024  [facility]  Time Calculation  Start Time: 1358  Stop Time: 1424  Time Calculation (min): 26 min    Assessment  IP OT Assessment  OT Assessment: Pt presents with impaired functional mobility, transfers, balance, ADLs/IADLs below reported baseline. Pt is mostly independent at baseline, has good access to DME/AE, good home setup, good insight, and good family support. Pt demonstrated good insight into her spinal precautions and verbalized understanding of all education on compensatory strategies and techniques for mobility. Pt appreciative of sock aid training and compensatory strategy education. Pt is likely to benefit from skilled OT services at a LOW intensity to address noted deficits and continue further training with ADL/IADL management with spinal precautions.  Prognosis: Good  Barriers to Discharge: None  Evaluation/Treatment Tolerance: Patient tolerated treatment well  Medical Staff Made Aware: Yes  End of Session Communication: Bedside nurse  End of Session Patient Position: Bed, 3 rail up, Alarm off, not on at start of session (flat supine)  Plan:  Inpatient Plan  Treatment Interventions: ADL retraining, Functional transfer training, Equipment evaluation/education, Compensatory technique education  OT Frequency: 2 times per week  OT Discharge Recommendations: Low intensity level of continued care  Equipment Recommended upon Discharge: Other (comment) (shower chair with back)  OT Recommended Transfer Status: Assist of 1  OT - OK to Discharge: Yes  OT Assessment  OT Assessment Results: Decreased ADL status, Decreased functional mobility, Decreased IADLs  Prognosis: Good  Barriers to Discharge: None  Evaluation/Treatment Tolerance: Patient tolerated treatment well  Medical Staff Made Aware: Yes  Strengths: Ability to acquire knowledge, Attitude of self, Capable of completing ADLs  semi/independent, Coping skills, Insight into problems, Premorbid level of function, Support of Caregivers  Barriers to Participation: Comorbidities    Subjective   Current Problem:  1. 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 Pleasant and agreeable to OT eval and tx. Drain clamped and RN cleared pt for OT. Pt appreciative of education, training.   Precautions:  Medical Precautions: Fall precautions  Post-Surgical Precautions: Spinal precautions (L spine)    Pain:  Pain Assessment  Pain Assessment: 0-10  0-10 (Numeric) Pain Score: 4  Pain Type: Acute pain, Surgical pain  Pain Location: Back  Pain Orientation: Lower, Mid  Pain Interventions: Repositioned, Ambulation/increased activity, Relaxation technique, Rest  Response to Interventions: Unchanged  Lines/Tubes/Drains:  Closed/Suction Drain 1 Inferior;Medial Back Bulb 7 Fr. (Active)   Number of days: 2     Objective   Cognition:  Overall Cognitive Status: Within Functional Limits  Orientation Level: Oriented X4  Insight: Within function limits  Impulsive: Within functional limits  Processing Speed: Within funtional limits    Home Living:  Type of Home: House  Lives With: Spouse,  "Adult children (, 20 yo son)  Home Adaptive Equipment: Walker rolling or standard, Cane, Other (Comment), Adaptive bed, Reacher (rollator)  Home Layout: Multi-level, Laundry main level, Able to live on main level with bedroom/bathroom, Stairs to alternate level with rails  Alternate Level Stairs-Rails:  (1)  Alternate Level Stairs-Number of Steps: 14 up to bedroom  Home Access: Stairs to enter with rails  Entrance Stairs-Rails:  (1 (first 3 steps))  Entrance Stairs-Number of Steps: 3+2  Bathroom Shower/Tub: Tub/shower unit, Walk-in shower  Bathroom Toilet: Standard  Bathroom Equipment: Bedside commode, Hand-held shower hose, Built-in shower seat  Bathroom Accessibility: Upstairs shower: WIS with built-in shower seat, HHS / Downstairs shower: tub shower, HHS  Home Living Comments: Pt has first floor setup with adjustable bed and full bath   Prior Function:  Level of [place]: Independent with ADLs and functional transfers, Independent with homemaking with ambulation  Receives Help From: Family (hsb)  ADL Assistance: Independent (Reports IND on most days but requires ModA on \"bad days\" for LB dressing (socks, shoes, thread pants))  Homemaking Assistance: Independent  Ambulatory Assistance: Independent  Hand Dominance: Right  IADL History:  Homemaking Responsibilities: Yes  Homemaking Comments: Most homemaking tasks shared between pt, her hsb, and son  Current License: No  Mode of Transportation: Family (hsb, mom (5 minutes away), dtr (10 minutes away))  Occupation: Unemployed  Type of Occupation: Previously cosmotology  Leisure and Hobbies: Draw, color, paint, crafts  ADL:  Eating Assistance: Independent (Anticipated)  Grooming Assistance: Independent (Anticipated)  Bathing Assistance: Minimal (Anticipated)  Bathing Deficit: Right lower leg including foot, Left lower leg including foot  UE Dressing Assistance: Independent (Anticipated)  LE Dressing Assistance: Minimal (Anticipated)  LE Dressing Deficit: " Don/doff L sock, Don/doff R sock, Thread RLE into pants, Thread LLE into pants, Thread RLE into underwear, Thread LLE into underwear  Toileting Assistance with Device: Stand by (Anticipated)  Toileting Deficit: Supervison/safety, Verbal cueing  Activity Tolerance:  Endurance: Tolerates 10 - 20 min exercise with multiple rests  Balance:  Dynamic Sitting Balance  Dynamic Sitting-Comments: SBA  Dynamic Standing Balance  Dynamic Standing-Balance Support: Bilateral upper extremity supported  Dynamic Standing-Level of Assistance: Close supervision  Dynamic Standing-Balance: Turning, Reaching across midline, Reaching for objects, Forward lean  Dynamic Standing-Comments: CGA-SBA with VCs to maintain spinal precautions  Static Sitting Balance  Static Sitting-Comment/Number of Minutes: IND  Static Standing Balance  Static Standing-Balance Support: Bilateral upper extremity supported  Static Standing-Level of Assistance: Close supervision  Static Standing-Comment/Number of Minutes: SBA with FWW  Bed Mobility/Transfers: Bed Mobility/Transfers: Bed Mobility  Bed Mobility: Yes  Bed Mobility 1  Bed Mobility 1: Supine to sitting, Log roll  Level of Assistance 1: Close supervision, Minimal verbal cues  Bed Mobility Comments 1: VCs for improved strategy but demonstrated good understanding  Bed Mobility 2  Bed Mobility  2: Sitting to supine, Log roll  Level of Assistance 2: Close supervision  Functional Mobility  Functional Mobility Performed: Yes  Functional Mobility 1  Comments 1: Pt ambulated MIN household distance in room from bedside to bathroom and back using FWW and SBA for safety   and Transfers  Transfer: Yes  Transfer 1  Transfer From 1: Bed to  Transfer to 1: Stand  Technique 1: Sit to stand  Transfer Device 1: Walker  Transfer Level of Assistance 1: Contact guard, Minimal verbal cues  Trials/Comments 1: VCs for FWW safety and sequencing  Transfers 2  Transfer From 2: Stand to, Toilet to  Transfer to 2: Toilet,  Stand  Technique 2: Stand to sit, Sit to stand  Transfer Device 2: Walker  Transfer Level of Assistance 2: Close supervision  Transfers 3  Transfer From 3: Stand to  Transfer to 3: Bed  Technique 3: Stand to sit  Transfer Device 3: Walker  Transfer Level of Assistance 3: Close supervision, Minimal verbal cues  Trials/Comments 3: VCs for positioning  IADL's:   Homemaking Responsibilities: Yes  Homemaking Comments: Most homemaking tasks shared between pt, her hsb, and son  Current License: No  Mode of Transportation: Family (hsb, mom (5 minutes away), dtr (10 minutes away))  Occupation: Unemployed  Type of Occupation: Previously cosmotology  Leisure and Hobbies: Draw, color, paint, crafts  Vision: Vision - Basic Assessment  Current Vision: Wears contacts   and Vision - Complex Assessment  Ocular Range of Motion: Within Functional Limits  Sensation:  Light Touch: No apparent deficits (BUE)  Strength:  Strength Comments: BUE WFL as demonstrated during functional transfers and ROM screen (not formally tested 2/2 spinal precautions)  Perception:  Inattention/Neglect: Appears intact  Initiation: Appears intact  Motor Planning: Appears intact  Perseveration: Not present  Coordination:  Movements are Fluid and Coordinated: Yes  Coordination Comment: Opposition WFL   Hand Function:  Hand Function  Gross Grasp: Functional  Coordination: Functional  Extremities:   RUE   RUE : Within Functional Limits, LUE   LUE: Within Functional Limits    Outcome Measures: [facility] Daily Activity  Putting on and taking off regular lower body clothing: A little  Bathing (including washing, rinsing, drying): A little  Putting on and taking off regular upper body clothing: None  Toileting, which includes using toilet, bedpan or urinal: A little  Taking care of personal grooming such as brushing teeth: None  Eating Meals: None  Daily Activity - Total Score: 21         ,     OT Adult Other Outcome Measures  4AT: -    Education Documentation  Body  Mechanics, taught by Mauro Junior, OT at 9/19/2024  2:59 PM.  Learner: Patient  Readiness: Acceptance  Method: Explanation, Demonstration  Response: Verbalizes Understanding, Demonstrated Understanding  Comment: Pt educated on AE for ADLs/IADLs, compensatory techniques/strategies to maintain spinal precautions while managing daily ADL/IADL tasks, and energy conservation techniques    Precautions, taught by Mauro Junior, OT at 9/19/2024  2:59 PM.  Learner: Patient  Readiness: Acceptance  Method: Explanation, Demonstration  Response: Verbalizes Understanding, Demonstrated Understanding  Comment: Pt educated on AE for ADLs/IADLs, compensatory techniques/strategies to maintain spinal precautions while managing daily ADL/IADL tasks, and energy conservation techniques    ADL Training, taught by Mauro Junior OT at 9/19/2024  2:59 PM.  Learner: Patient  Readiness: Acceptance  Method: Explanation, Demonstration  Response: Verbalizes Understanding, Demonstrated Understanding  Comment: Pt educated on AE for ADLs/IADLs, compensatory techniques/strategies to maintain spinal precautions while managing daily ADL/IADL tasks, and energy conservation techniques    Education Comments  No comments found.      Goals:   Encounter Problems       Encounter Problems (Active)       ADLs       Pt will complete UB /LB bathing tasks with modified independence while seated and AE as needed.        Start:  09/19/24    Expected End:  10/03/24            Pt will complete LB dressing with modified independence while seated and/or standing and AE as needed.        Start:  09/19/24    Expected End:  10/03/24            Pt will complete simulated IADL tasks (meal prep, laundry, bed management, etc.) with modified independence and while maintaining spinal precautions with no verbal cues.       Start:  09/19/24    Expected End:  10/03/24               COGNITION/SAFETY       Patient will recall and adhere to spinal precautions  during all functional mobility/ADL tasks in order to demonstrate improved understanding and promote healing post op       Start:  09/19/24    Expected End:  10/03/24               MOBILITY       Patient will perform Functional mobility max Household distances/Community Distances with modified independent level of assistance and least restrictive device in order to improve safety and functional mobility.       Start:  09/19/24    Expected End:  10/03/24               TRANSFERS       Patient will complete sit to stand transfer with modified independent level of assistance and least restrictive device in order to improve safety and prepare for out of bed mobility.       Start:  09/19/24    Expected End:  10/03/24                   Treatment Completed on Evaluation    Activities of Daily Living:      LE Dressing  LE Dressing: Yes  LE Dressing Adaptive Equipment: Sock aide  Sock Level of Assistance: Close supervision, Minimal verbal cues  LE Dressing Where Assessed: Edge of bed  LE Dressing Comments: Pt educated on use of sock aid- pt able to doff bilateral socks using figure four technique. Pt then provided visual demonstration of sock aid and was able to demonstrate understanding with SBA and Min VCs for improved strategy.    Toileting  Toileting Comments: Pt educated on compensatory strategies for clothing management and perineal hygiene while maintaining spinal precautions- simulated understanding    Therapy/Activity:     Therapeutic Activity  Therapeutic Activity Performed: Yes  Therapeutic Activity 1: Functional mobility and transfer training as noted requiring skilled assistance and cueing for safety and training  Therapeutic Activity 2: In-depth education provided on compensatory strategies for ADL/IADL management while maintaining spinal precautions  Therapeutic Activity 3: Education on therapeutic benefits of OT, OT POC, and goals of LOW intensity rehab    09/19/24 at 3:01 PM   Mauro Junior OT   Rehab  Office: [phone]

## 2024-09-19 NOTE — PROGRESS NOTES
"PSYCHIATRY CONSULT-LIAISON PROGRESS NOTE    SUBJECTIVE  Lynnette Baig is a 44 y.o. female with a past psychiatric history of depression, substance abuse and a past medical history of HTN and chronic back pain who was admitted to [facility] on 9/13/24 for acute exacerbation of LBP and LLE radiculopathy. Psychiatry was consulted on 9/16/24 for chemical dependency assessment of opioid withdrawal.     On interview today, patient reported that she is feeling better after receiving her second dose of Suboxone prior to bedtime last night and that she is no longer in withdrawal, which she experienced yesterday after receiving her initial dose in the morning. She noted improvements in her sleep and appetite since yesterday. She was calm and coherent, able to answer questions appropriately. She reiterated that she is eager to be able to sit upright.     OBJECTIVE    VITALS      9/18/2024     7:44 AM 9/18/2024    11:00 AM 9/18/2024     4:10 PM 9/18/2024     8:22 PM 9/19/2024     1:19 AM 9/19/2024     4:12 AM 9/19/2024     8:55 AM   Vitals   Systolic 125 182 127 116 110 141 111   Diastolic 82 116 85 75 75 84 75   Heart Rate 94 102 116 109 96 87 97   Temp 35.5 °C (95.9 °F) 35.6 °C (96.1 °F) 35.6 °C (96.1 °F) 36.4 °C (97.5 °F) 36.3 °C (97.3 °F) 36.1 °C (97 °F) 36 °C (96.8 °F)   Resp 18 18 18 18 18 19 18        LABS  No results found for this or any previous visit (from the past 24 hour(s)).     IMAGING  No results found.     MENTAL STATUS EXAM  General: No acute distress, laying in bed comfortably during interview.  Appearance:  female appearing stated age, appropriately dressed/groomed.  Attitude: Calm, in good spirits.  Behavior: Cooperative, appropriate eye contact, .  Motor Activity: No psychomotor agitation/retardation, no tics noted.  Speech: Normal rate/rhythm/volume.  Mood: \"In pain but recovering\".  Affect: Full range, mood congruence.  Thought Process: Linear, goal-directed, organized.  Thought Content: Denied " current suicidal ideation. Denied homicidal ideation. No delusions elicited.  Perception: Denied visual hallucinations. Denied auditory hallucinations. Did not appear to be internally stimulated.  Cognition: Alert and oriented to person, place, time. No deficits in attention, concentration or language. Able to answer questions appropriately throughout interview, and memory appeared intact with adequate fund of knowledge.  Insight: Appropriate understanding of current circumstances.  Judgement: Intact, able to make reasonable decisions about ordinary activities of daily living and medical care.    PSYCHIATRIC RISK ASSESSMENT  Acute Risk of Harm to Others is Considered: Low  Acute Risk of Harm to Self is Considered: Low    CURRENT MEDICATIONS  Scheduled medications  acetaminophen, 975 mg, oral, q6h CORI  buprenorphine-naloxone, 0.5 Film, sublingual, BID  [START ON 9/20/2024] buprenorphine-naloxone, 1 Film, sublingual, BID  cyclobenzaprine, 10 mg, oral, TID  heparin (porcine), 5,000 Units, subcutaneous, q8h  ketorolac, 30 mg, intravenous, q6h CORI  lidocaine, 2 patch, transdermal, Daily  lisinopril 20 mg, hydroCHLOROthiazide 25 mg for Zestoretic/Prinizide, , oral, Nightly  polyethylene glycol, 17 g, oral, Daily  sennosides-docusate sodium, 2 tablet, oral, BID  sodium chloride, 500 mL, intravenous, Once        Continuous medications       PRN medications  PRN medications: cloNIDine, dicyclomine, HYDROmorphone, hydrOXYzine HCL, naloxone, ondansetron, oxyCODONE, oxyCODONE, oxyCODONE, traZODone       ASSESSMENT AND PLAN  Lynnette Baig is a 44 y.o. 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 Psychiatry was consulted on  "9/16/24 for chemical dependency assessment of heroin withdrawal.      On initial assessment, urine toxicology screen on admission was positive for amphetamines, opiates, fentanyl, oxycodone, cocaine. Patient reported that she has been using heroin and oxycodone on a daily basis prior to her hospitalization. She stated that in the past she has experienced multiple symptoms of withdrawal, including nausea, vomiting (i.e., dry heaving), agitation, and formication. She reported that her sleep has not been great in the hospital (i.e., 4 hours total, waking in middle of night) but denied insomnia. She endorsed past thoughts of self-harm but denied homicidal thoughts, AH/VH, psychosis symptoms.  She was calm and in good spirits, especially when discussing her hobbies (art, sketching, painting) which she stated help with her mood. She expressed interest in art therapy. Post-op patient had been receiving adjunctive opioid therapy for pain management, with plan to taper opioids in parallel with microinduction Suboxone treatment for opioid dependence.       Update 09/19/24:  Patient reported that she is feeling better after receiving her second dose of Suboxone prior to bedtime last night and that she is no longer in withdrawal, which she experienced yesterday after receiving her initial dose in the morning. She noted improvements in her sleep and appetite since yesterday. She was calm and coherent, able to answer questions appropriately. She reiterated that she is eager to be able to sit upright.    Impression:  Opioid Use Disorder  Depression by hx    Recommendations  Safety:  - Patient does not currently meet criteria for inpatient psychiatric admission.   - To evaluate decision-making capacity, recommend use of the Capacity Evaluation Tool. Search “[facility] Capacity Evaluation under SmartText\"   - Patient does not require a 1:1 sitter from a psychiatric perspective at this time.  - Defer to primary team decision for 1:1 " sitter for safety precautions.  - As with all hospitalized patients, would recommend delirium precautions, as below.    Delirium Guidelines  Provide glasses, hearing aids and/or communication boards as needed for impairments  Frequent reorientation, minimize room and staff changes  Open blinds during the day, dark/quiet room at night   Minimal interruptions and daytime naps  Early evaluation and intervention by PT, out of bed as tolerated  Minimize use of restraints   Minimize use of benzodiazepines, anticholinergic medications, and opiates (while ensuring adequate treatment of pain)  Keep Mg>2, K>4 (as able)  Ensure regular bowel and bladder function (as able)    Work-up:  - EKG (9/18/24)QTC 445ms  - Defer to primary team for continued monitoring of QTc interval     Ancillary Services:  - Recommend art therapy consult.     Medications:  Suboxone Microinduction  1/4 film SL BID on 9/18 (administered)  1/2 film SL BID on 9/19  1 full film SL BID on 9/20    Begin opioid taper on 9/20    Medication Consent: n/a; consult service    Follow-up:  - Patient will benefit from outpatient chemical dependency service. Patient will be given list of suitable outpatient centers prior to discharge.      ==========   - Discussed recommendations with primary team.  - Psychiatry will continue to follow.    Brenda Saldivar, Ph.D.  Medical Student, MS-3  [facility]    -----------------------------------------------------------------  Senior Resident Addendum:  I examined & discussed the patient above. I have reviewed and agree with the excellent note above.     Marvin Sheehan MD ([place])  PGY-4 Neurology

## 2024-09-20 ENCOUNTER — DOCUMENTATION (OUTPATIENT)
Dept: HOME HEALTH SERVICES | Facility: HOME HEALTH | Age: 44
End: 2024-09-20
Payer: COMMERCIAL

## 2024-09-20 ENCOUNTER — PHARMACY VISIT (OUTPATIENT)
Dept: PHARMACY | Facility: CLINIC | Age: 44
End: 2024-09-20
Payer: COMMERCIAL

## 2024-09-20 VITALS
SYSTOLIC BLOOD PRESSURE: 114 MMHG | TEMPERATURE: 97.7 F | RESPIRATION RATE: 19 BRPM | OXYGEN SATURATION: 95 % | BODY MASS INDEX: 38 KG/M2 | HEART RATE: 103 BPM | HEIGHT: 60 IN | WEIGHT: 193.56 LBS | DIASTOLIC BLOOD PRESSURE: 80 MMHG

## 2024-09-20 PROCEDURE — 2500000002 HC RX 250 W HCPCS SELF ADMINISTERED DRUGS (ALT 637 FOR MEDICARE OP, ALT 636 FOR OP/ED): Performed by: PHYSICIAN ASSISTANT

## 2024-09-20 PROCEDURE — 2500000001 HC RX 250 WO HCPCS SELF ADMINISTERED DRUGS (ALT 637 FOR MEDICARE OP): Performed by: STUDENT IN AN ORGANIZED HEALTH CARE EDUCATION/TRAINING PROGRAM

## 2024-09-20 PROCEDURE — 2500000004 HC RX 250 GENERAL PHARMACY W/ HCPCS (ALT 636 FOR OP/ED): Performed by: STUDENT IN AN ORGANIZED HEALTH CARE EDUCATION/TRAINING PROGRAM

## 2024-09-20 PROCEDURE — 99239 HOSP IP/OBS DSCHRG MGMT >30: CPT | Performed by: PHYSICIAN ASSISTANT

## 2024-09-20 PROCEDURE — RXMED WILLOW AMBULATORY MEDICATION CHARGE

## 2024-09-20 PROCEDURE — 2500000005 HC RX 250 GENERAL PHARMACY W/O HCPCS: Performed by: STUDENT IN AN ORGANIZED HEALTH CARE EDUCATION/TRAINING PROGRAM

## 2024-09-20 PROCEDURE — 97116 GAIT TRAINING THERAPY: CPT | Mod: GP

## 2024-09-20 PROCEDURE — 2500000004 HC RX 250 GENERAL PHARMACY W/ HCPCS (ALT 636 FOR OP/ED)

## 2024-09-20 RX ORDER — LIDOCAINE 560 MG/1
2 PATCH PERCUTANEOUS; TOPICAL; TRANSDERMAL DAILY
Qty: 7 PATCH | Refills: 0 | Status: SHIPPED | OUTPATIENT
Start: 2024-09-20 | End: 2024-09-27

## 2024-09-20 RX ORDER — CYCLOBENZAPRINE HCL 10 MG
10 TABLET ORAL 3 TIMES DAILY
Qty: 21 TABLET | Refills: 0 | Status: SHIPPED | OUTPATIENT
Start: 2024-09-20 | End: 2024-09-27

## 2024-09-20 RX ORDER — BUPRENORPHINE AND NALOXONE 2; .5 MG/1; MG/1
1 FILM, SOLUBLE BUCCAL; SUBLINGUAL 2 TIMES DAILY
Qty: 60 FILM | Refills: 0 | Status: SHIPPED | OUTPATIENT
Start: 2024-09-20 | End: 2024-10-20

## 2024-09-20 RX ORDER — POLYETHYLENE GLYCOL 3350 17 G/17G
17 POWDER, FOR SOLUTION ORAL DAILY
Qty: 7 PACKET | Refills: 0 | Status: SHIPPED | OUTPATIENT
Start: 2024-09-20 | End: 2024-09-27

## 2024-09-20 RX ORDER — AMOXICILLIN 250 MG
2 CAPSULE ORAL 2 TIMES DAILY
Qty: 28 TABLET | Refills: 0 | Status: SHIPPED | OUTPATIENT
Start: 2024-09-20 | End: 2024-09-27

## 2024-09-20 RX ORDER — OXYCODONE HYDROCHLORIDE 5 MG/1
5 TABLET ORAL EVERY 6 HOURS PRN
Qty: 12 TABLET | Refills: 0 | Status: SHIPPED | OUTPATIENT
Start: 2024-09-20 | End: 2024-09-23

## 2024-09-20 ASSESSMENT — PAIN SCALES - GENERAL
PAINLEVEL_OUTOF10: 3
PAINLEVEL_OUTOF10: 3
PAINLEVEL_OUTOF10: 5 - MODERATE PAIN
PAINLEVEL_OUTOF10: 2
PAINLEVEL_OUTOF10: 3
PAINLEVEL_OUTOF10: 8

## 2024-09-20 ASSESSMENT — COGNITIVE AND FUNCTIONAL STATUS - GENERAL
MOVING TO AND FROM BED TO CHAIR: A LITTLE
STANDING UP FROM CHAIR USING ARMS: A LITTLE
WALKING IN HOSPITAL ROOM: A LITTLE
MOBILITY SCORE: 19
TURNING FROM BACK TO SIDE WHILE IN FLAT BAD: A LITTLE
CLIMB 3 TO 5 STEPS WITH RAILING: A LITTLE

## 2024-09-20 ASSESSMENT — PAIN - FUNCTIONAL ASSESSMENT
PAIN_FUNCTIONAL_ASSESSMENT: 0-10

## 2024-09-20 NOTE — DISCHARGE SUMMARY
Discharge Diagnosis  Intradural extramedullary spinal tumor    Test Results Pending At Discharge  Pending Labs       Order Current Status    Surgical Pathology Exam In process          Hospital Course  Lynnette Baig is a 44 y.o. female with a past medical history of HTN and vitamin D deficiency who presented to the [facility] ED on 9/13 with low back pain and LLE radiculopathy. MRI T/L spine with 3.7cm L4-L5 intradural lesion. Patient admitted to neurosurgery service for further management.     9/16 s/p L3-5 lami for intradural tumor resection (schwannoma).   9/17 Drain insertion site over sewn due to noted leakage, which resolved. Chemical dependency consulted due to reported prior daily heroine use and recommended initiation of suboxone.    9/19 HOB restrictions relaxed and patient able to get OOB to work with PT/OT. Aguilera removed without complication. Lumbar drain clamped in AM and subsequently dc'd.   9/20 Patient cleared for discharge home. Chemical dependency coordinated outpatient follow up and recommend discharge with suboxone at current dosing (1 film BID).     PT/OT evaluated patient and recommended low intensity continued level of care for which referral was placed for home care.     Patient discharged with detailed instructions and scheduled outpatient follow up.     Pertinent Physical Exam At Time of Discharge  Constitutional: A&Ox3, calm and cooperative, NAD.  Eyes: PERRL, clear sclera.  ENMT: Moist mucous membranes, no apparent injuries or lesions.  Head/Neck: Face symmetric.   Cardiovascular: Normal rate and regular rhythm. 2+ equal pulses of the distal extremities.  Respiratory/Thorax: CTAB, regular respirations on RA. Good symmetric chest expansion.   Gastrointestinal: Abdomen soft, non tender.   Genitourinary: Voiding independently.   Extremities: RUE 5/5. LUE 5/5. RLE 5/5. LLE 5/5. Sensation intact to light touch throughout all extremities.  Neurological: A&Ox3.   Psychological: Appropriate mood  and behavior.   Skin: Incision C/D/I. No evidence of active leakage from incision or prior drain site.     Home Medications     Medication List      START taking these medications     buprenorphine-naloxone 2-0.5 mg per sublingual film; Commonly known as:   Suboxone; Place 1 Film under the tongue 2 times a day.   lidocaine 4 % patch; Place 2 patches over 12 hours on the skin once   daily for 7 days. Remove & discard patch within 12 hours or as directed by   MD.; Replaces: lidocaine 5 % patch   polyethylene glycol 17 gram packet; Commonly known as: Glycolax,   Miralax; Take 17 g by mouth once daily for 7 days.   sennosides-docusate sodium 8.6-50 mg tablet; Commonly known as:   Kathleen-Colace; Take 2 tablets by mouth 2 times a day for 7 days.     CHANGE how you take these medications     cyclobenzaprine 10 mg tablet; Commonly known as: Flexeril; Take 1 tablet   (10 mg) by mouth 3 times a day for 7 days.; What changed: when to take   this, reasons to take this, Another medication with the same name was   removed. Continue taking this medication, and follow the directions you   see here.   oxyCODONE 5 mg immediate release tablet; Commonly known as: Roxicodone;   Take 1 tablet (5 mg) by mouth every 6 hours if needed for severe pain (7 -   10) for up to 3 days.; What changed: when to take this     CONTINUE taking these medications     acetaminophen 500 mg tablet; Commonly known as: Tylenol   lisinopriL-hydrochlorothiazide 20-25 mg tablet     STOP taking these medications     ibuprofen 200 mg tablet   lidocaine 5 % patch; Commonly known as: Lidoderm; Replaced by: lidocaine   4 % patch     Outpatient Follow-Up  Future Appointments   Date Time Provider Department Center   10/3/2024  2:00 PM Gabe Thomas MD PhD [id number]   10/10/2024  4:45 PM [facility]       Natalie Xavier PA-C    Total face to face time spent with patient/family of >30 minutes, with >50% of the time spent discussing plan  of care/management, counseling/educating on disease processes, explaining results of diagnostic testing.'

## 2024-09-20 NOTE — PROGRESS NOTES
Pt was informed that  Home Care was not able to accept. Other referrals were sent thru [facility] but there is no accepting agency. Pt was given RX for Outpatient PT and OT. Miriam Sosa RN

## 2024-09-20 NOTE — PROGRESS NOTES
Physical Therapy    Physical Therapy Treatment    Patient Name: Lynnette Baig  MRN: [id number]  Department: [place] [id number]  Room: [address] [place] [address]  Today's Date: 9/20/2024  Time Calculation  Start Time: 1004  Stop Time: 1014  Time Calculation (min): 10 min         Assessment/Plan   PT Assessment  PT Assessment Results: Decreased strength, Decreased range of motion, Impaired balance, Decreased mobility, Pain, Orthopedic restrictions  Rehab Prognosis: Good  Evaluation/Treatment Tolerance: Patient tolerated treatment well  Medical Staff Made Aware: Yes  Strengths: Ability to acquire knowledge  Barriers to Participation: Comorbidities  End of Session Communication: Bedside nurse  Assessment Comment: Patient tolerated the session well. Able to progress OOB mobility with stairs and increase gait distance. Remains appropriate for low intensity rehab.  End of Session Patient Position: Bed, 2 rail up, Alarm off, not on at start of session (Sitting at EOB with tray table in front; RN aware of patient's Increased HR)  PT Plan  Inpatient/Swing Bed or Outpatient: Inpatient  PT Plan  Treatment/Interventions: Bed mobility, Transfer training, Gait training, Stair training, Balance training, Neuromuscular re-education, Strengthening, Endurance training, Range of motion, Therapeutic exercise, Therapeutic activity, Home exercise program, Positioning, Postural re-education  PT Plan: Ongoing PT  PT Frequency: Daily  PT Discharge Recommendations: Low intensity level of continued care  Equipment Recommended upon Discharge:  (Shower Chair with Back)  PT Recommended Transfer Status: Contact guard  PT - OK to Discharge: Yes (Once medically stable)      General Visit Information:   PT  Visit  PT Received On: 09/20/24  General  Reason for Referral: Admitted 9/13 with back pain radiating to legs (L>R, inability to ambulate; 2 previous falls/injuries); 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; 9/16 s/p L3-5 laminectomy for intradural tumor  "resection; per neuro note 9/17: Flat until 9/18 AM  Past Medical History Relevant to Rehab: remote hip and back injury, HTN, vit D deficiency, LBP with Left LE radiculopathy  Family/Caregiver Present: No  Prior to Session Communication: Bedside nurse  Patient Position Received: Bed, 3 rail up, Alarm off, not on at start of session  General Comment: Pt is eating breakfast upon arrival; agreeable to PT    Subjective   Precautions:  Precautions  Medical Precautions: Fall precautions  Post-Surgical Precautions: Spinal precautions    Vitals:   Pre: 104/68mmHg; 97% SpO2; 127bpm   Post: 107/66mmHg; 95% SpO2; 129bpm    Objective   Pain:  Pain Assessment  Pain Assessment: 0-10  0-10 (Numeric) Pain Score: 8  Pain Type: Acute pain, Surgical pain  Pain Location: Back  Pain Orientation: Lower  Pain Descriptors:  (\"hurts\")  Pain Frequency: Constant/continuous  Cognition:  Cognition  Overall Cognitive Status: Within Functional Limits  Orientation Level: Oriented X4  Following Commands: Follows all commands and directions without difficulty  Safety Judgment: Good awareness of safety precautions  Postural Control:  Postural Control  Postural Control: Within Functional Limits  Posture Comment: No BLT - good posture at EOB with no back support; slight rounded shoulders  Static Sitting Balance  Static Sitting-Balance Support: Feet supported, Bilateral upper extremity supported  Static Sitting-Level of Assistance: Close supervision  Dynamic Sitting Balance  Dynamic Sitting-Balance Support: Feet supported, Bilateral upper extremity supported  Dynamic Sitting-Level of Assistance: Close supervision  Dynamic Sitting-Balance: Trunk control activities  Static Standing Balance  Static Standing-Balance Support: No upper extremity supported  Static Standing-Level of Assistance: Contact guard  Dynamic Standing Balance  Dynamic Standing-Balance Support: No upper extremity supported  Dynamic Standing-Level of Assistance: Contact guard  Activity " Tolerance:  Activity Tolerance  Endurance: Endurance does not limit participation in activity  Treatments:  Bed Mobility  Bed Mobility: Yes  Bed Mobility 1  Bed Mobility 1: Supine to sitting, Log roll  Level of Assistance 1: Close supervision, Minimal verbal cues    Ambulation/Gait Training  Ambulation/Gait Training Performed: Yes  Ambulation/Gait Training 1  Surface 1: Level tile, Carpet  Device 1: No device  Assistance 1: Contact guard  Quality of Gait 1: Diminished heel strike, Decreased step length, Antalgic (Decreased Shona)  Comments/Distance (ft) 1: 2x100'  Transfers  Transfer: Yes  Transfer 1  Transfer From 1: Sit to, Stand to  Transfer to 1: Sit, Stand  Technique 1: Sit to stand, Stand to sit  Transfer Level of Assistance 1: Close supervision  Trials/Comments 1: Min VC for proper sequencing/positioning    Stairs  Stairs: Yes  Stairs  Rails 1: Right  Device 1: Railing  Assistance 1: Contact guard  Comment/Number of Steps 1: Ascends/Descends 3 steps at a step to step pattern; min VC for proper sequencing/positioning    Outcome Measures:  [facility] Basic Mobility  Turning from your back to your side while in a flat bed without using bedrails: None  Moving from lying on your back to sitting on the side of a flat bed without using bedrails: A little  Moving to and from bed to chair (including a wheelchair): A little  Standing up from a chair using your arms (e.g. wheelchair or bedside chair): A little  To walk in hospital room: A little  Climbing 3-5 steps with railing: A little  Basic Mobility - Total Score: 19    Education Documentation  Handouts, taught by Jodi Dang PT at 9/20/2024 11:47 AM.  Learner: Patient  Readiness: Acceptance  Method: Explanation, Demonstration  Response: Demonstrated Understanding, Verbalizes Understanding    Precautions, taught by Jodi Dang PT at 9/20/2024 11:47 AM.  Learner: Patient  Readiness: Acceptance  Method: Explanation, Demonstration  Response: Demonstrated  Understanding, Verbalizes Understanding    Body Mechanics, taught by Jodi Dang PT at 9/20/2024 11:47 AM.  Learner: Patient  Readiness: Acceptance  Method: Explanation, Demonstration  Response: Demonstrated Understanding, Verbalizes Understanding    Mobility Training, taught by Jodi Dang PT at 9/20/2024 11:47 AM.  Learner: Patient  Readiness: Acceptance  Method: Explanation, Demonstration  Response: Demonstrated Understanding, Verbalizes Understanding    Education Comments  No comments found.        Encounter Problems       Encounter Problems (Active)       PT Problem       Stairs: pt will ascend/descend 4-6 steps in reciprocal pattern with proper gait mechanics and use of HR to promote functional mobility and improve functional performance.  (IND)  (Progressing)       Start:  09/19/24    Expected End:  10/03/24            Gait: Pt will ambulate 100+ft with no device at independent with no LOB.   (Met)       Start:  09/19/24    Expected End:  10/03/24    Resolved:  09/20/24         Bed Mobility:  Patient will perform bed mobility at independent to improve functional mobility and maximize patient safety.    (Progressing)       Start:  09/19/24    Expected End:  10/03/24            Transfers: Patient will perform functional transfers at independent to improve functional mobility and maximize patient safety.    (Progressing)       Start:  09/19/24    Expected End:  10/03/24                   09/20/24 at 11:48 AM - Jodi Dang PT

## 2024-09-20 NOTE — PROGRESS NOTES
Lynnette Baig is a 44 y.o. female on day 6 of admission presenting with Intradural extramedullary spinal tumor.    Subjective   No acute events overnight, feels better       Objective     Physical Exam    A&Ox3  Face symmetric  RUE 5/5  LUE 5/5  RLE 5/5  LLE 5/5  Sensation intact to light touch throughout all extremities  Incision covered with dressing, no drainage on dressing noted      Last Recorded Vitals  Blood pressure 104/68, pulse 107, temperature 36.4 °C (97.5 °F), temperature source Temporal, resp. rate 19, height 1.524 m (5'), weight 87.8 kg (193 lb 9 oz), SpO2 98%.  Intake/Output last 3 Shifts:  I/O last 3 completed shifts:  In: 1085 (12.4 mL/kg) [P.O.:260; I.V.:825 (9.4 mL/kg)]  Out: 4080 (46.5 mL/kg) [Urine:3950 (1.2 mL/kg/hr); Drains:130]  Weight: 87.8 kg     Relevant Results               Assessment/Plan   Assessment & Plan  Intradural extramedullary spinal tumor    HTN (hypertension)    Lynnette Baig is a 44 y.o. female h/o HTN, vit D deficiency, daily heroin use (snorts) p/w LBP w LLE radiculopathy, MRI T/L 3.7 cm L4-L5 intradural lesion, 9/16 s/p L3-5 lami for intradural tumor resection (prelim: schwannoma), 9/17 drain leaking, oversewn, 9/19 drain clamped at 12p, drain dc'd     Plan  Floor  Ok to mobilize  Clinical dependency recs  PTOT  SCDs, SQH    Medically ready for discharge, dispo in PM    Kristina Guerrero MD

## 2024-09-20 NOTE — PROGRESS NOTES
"PSYCHIATRY CONSULT-LIAISON PROGRESS NOTE    SUBJECTIVE    Patient has been doing well with suboxone mini induction. Denied withdrawal symptoms. She expressed positive attitude that she would like to get over it. She tried to contact a facility close to her home for appointment and left a message.    OBJECTIVE    VITALS      9/19/2024     4:12 AM 9/19/2024     8:55 AM 9/19/2024    11:55 AM 9/19/2024     4:00 PM 9/19/2024     8:49 PM 9/20/2024    12:15 AM 9/20/2024     4:16 AM   Vitals   Systolic 141 111 118 120 115 106 104   Diastolic 84 75 83 82 75 76 68   Heart Rate 87 97 111 104 100 102 107   Temp 36.1 °C (97 °F) 36 °C (96.8 °F) 36 °C (96.8 °F) 36.3 °C (97.3 °F) 36.5 °C (97.7 °F) 35.9 °C (96.6 °F) 36.4 °C (97.5 °F)   Resp 19 18 18 19 18 18 19        MENTAL STATUS EXAM  Appearance: Standing at bedside, making her bed in the morning.  Attitude:  female appearing stated age, appropriately dressed/groomed.   Behavior: Cooperative, limited eye contact.  Motor Activity: Mild psychomotor agitation.  Speech: Normal rate/rhythm/volume.   Mood: \"Fine\"  Affect: Full range, mood congruence.   Thought Process: Linear, goal-directed, organized.   Thought Content:  Denied current suicidal ideation. Denied homicidal ideation. No delusions elicited   Thought Perception: Denied visual hallucinations. Denied auditory hallucinations. Did not appear to be internally stimulated.   Cognition: Alert and oriented to person, place, time. No deficits in attention, concentration or language. Able to answer questions appropriately throughout interview, and memory appeared intact with adequate fund of knowledge.   Insight: Appropriate understanding of current circumstances.   Judgement: Intact, able to make reasonable decisions about ordinary activities of daily living and medical care.     CURRENT MEDICATIONS  Scheduled medications  acetaminophen, 975 mg, oral, q6h CORI  buprenorphine-naloxone, 1 Film, sublingual, " BID  cyclobenzaprine, 10 mg, oral, TID  heparin (porcine), 5,000 Units, subcutaneous, q8h  lidocaine, 2 patch, transdermal, Daily  lisinopril 20 mg, hydroCHLOROthiazide 25 mg for Zestoretic/Prinizide, , oral, Nightly  polyethylene glycol, 17 g, oral, Daily  sennosides-docusate sodium, 2 tablet, oral, BID        Continuous medications       PRN medications  PRN medications: cloNIDine, dicyclomine, HYDROmorphone, hydrOXYzine HCL, naloxone, ondansetron, oxyCODONE, oxyCODONE, oxyCODONE, traZODone     LABS  Results for orders placed or performed during the hospital encounter of 09/14/24 (from the past 24 hour(s))   CBC   Result Value Ref Range    WBC 9.8 4.4 - 11.3 x10*3/uL    nRBC 0.0 0.0 - 0.0 /100 WBCs    RBC 3.93 (L) 4.00 - 5.20 x10*6/uL    Hemoglobin 10.4 (L) 12.0 - 16.0 g/dL    Hematocrit 34.5 (L) 36.0 - 46.0 %    MCV 88 80 - 100 fL    MCH 26.5 26.0 - 34.0 pg    MCHC 30.1 (L) 32.0 - 36.0 g/dL    RDW 14.5 11.5 - 14.5 %    Platelets 339 150 - 450 x10*3/uL   Renal Function Panel   Result Value Ref Range    Glucose 88 74 - 99 mg/dL    Sodium 136 136 - 145 mmol/L    Potassium 3.8 3.5 - 5.3 mmol/L    Chloride 100 98 - 107 mmol/L    Bicarbonate 30 21 - 32 mmol/L    Anion Gap 10 10 - 20 mmol/L    Urea Nitrogen 14 6 - 23 mg/dL    Creatinine 0.62 0.50 - 1.05 mg/dL    eGFR >90 >60 mL/min/1.73m*2    Calcium 8.8 8.6 - 10.6 mg/dL    Phosphorus 3.8 2.5 - 4.9 mg/dL    Albumin 3.2 (L) 3.4 - 5.0 g/dL        IMAGING  No results found.     PSYCHIATRIC RISK ASSESSMENT  Acute Risk of Harm to Others is Considered: Low  Acute Risk of Harm to Self is Considered: Low    ASSESSMENT AND PLAN  ASSESSMENT AND PLAN  Lynnette Baig is a 44 y.o. female on day 4 of admission, with a past psychiatric history of depression, substance abuse and a past medical history of HTN and chronic back pain, who was admitted to [facility] on 9/13/24 for acute exacerbation of LBP and LLE radiculopathy and surgical resection of intradural extramedullary spinal tumor  (schwannoma) in the lumbar spine (at L4-L5). Psychiatry was consulted on 9/16/24 for chemical dependency assessment of heroin withdrawal.      On initial assessment, urine toxicology screen on admission was positive for amphetamines, opiates, fentanyl, oxycodone, cocaine. Patient reported that she has been using heroin and oxycodone on a daily basis prior to her hospitalization. She stated that in the past she has experienced multiple symptoms of withdrawal, including nausea, vomiting (i.e., dry heaving), agitation, and formication. She reported that her sleep has not been great in the hospital (i.e., 4 hours total, waking in middle of night) but denied insomnia. She endorsed past thoughts of self-harm but denied homicidal thoughts, AH/VH, psychosis symptoms.  She was calm and in good spirits, especially when discussing her hobbies (art, sketching, painting) which she stated help with her mood. She expressed interest in art therapy. Post-op patient had been receiving adjunctive opioid therapy for pain management, with plan to taper opioids in parallel with microinduction Suboxone treatment for opioid dependence.         9/19/24:  Patient reported that she is feeling better after receiving her second dose of Suboxone prior to bedtime last night and that she is no longer in withdrawal, which she experienced yesterday after receiving her initial dose in the morning. She noted improvements in her sleep and appetite since yesterday. She was calm and coherent, able to answer questions appropriately. She reiterated that she is eager to be able to sit upright.    9/20/24:  Patient was cleared for standing. Worked with PT, plan discharged home this afternoon. She demonstrated good attitude toward treatment. Tolerating medication well. She will get 30 days supply and follow up with outpatient provider.     Impression:  Opioid Use Disorder  Depression by hx     Recommendations  Safety:  - Patient does not currently meet  "criteria for inpatient psychiatric admission.   - To evaluate decision-making capacity, recommend use of the Capacity Evaluation Tool. Search “[facility] Capacity Evaluation under SmartText\"   - Patient does not require a 1:1 sitter from a psychiatric perspective at this time.  - Defer to primary team decision for 1:1 sitter for safety precautions.  - As with all hospitalized patients, would recommend delirium precautions, as below.     Delirium Guidelines  Provide glasses, hearing aids and/or communication boards as needed for impairments  Frequent reorientation, minimize room and staff changes  Open blinds during the day, dark/quiet room at night   Minimal interruptions and daytime naps  Early evaluation and intervention by PT, out of bed as tolerated  Minimize use of restraints   Minimize use of benzodiazepines, anticholinergic medications, and opiates (while ensuring adequate treatment of pain)  Keep Mg>2, K>4 (as able)  Ensure regular bowel and bladder function (as able)     Work-up:  - EKG (9/18/24)QTC 445ms  - Defer to primary team for continued monitoring of QTc interval     Ancillary Services:  - Recommend art therapy consult.      Medications:  Suboxone Microinduction  1/4 film SL BID on 9/18 (administered)  1/2 film SL BID on 9/19  1 full film SL BID on 9/20    Patient is going home today, she is contacting facility close to her home for follow up.  Primary team to prescribed Suboxone (2-0.5) 1 film SL BID for 30 days.  Patient required less opioid medication over the past 2 days.     Medication Consent: n/a; consult service     Follow-up:  - Patient will benefit from outpatient chemical dependency service. Patient will be given list of resources prior to discharge.    ==========   - Discussed recommendations with primary team.  - Psychiatry will continue to follow.    Patient was discussed with Dr. Higgins.    Marvin Sheehan MD ([place])  Neurology Resident, PGY4  "

## 2024-09-22 LAB
ATRIAL RATE: 107 BPM
P AXIS: 68 DEGREES
P OFFSET: 185 MS
P ONSET: 130 MS
PR INTERVAL: 176 MS
Q ONSET: 218 MS
QRS COUNT: 17 BEATS
QRS DURATION: 100 MS
QT INTERVAL: 334 MS
QTC CALCULATION(BAZETT): 445 MS
QTC FREDERICIA: 405 MS
R AXIS: 42 DEGREES
T AXIS: 54 DEGREES
T OFFSET: 385 MS
VENTRICULAR RATE: 107 BPM

## 2024-09-27 LAB
LAB AP ASR DISCLAIMER: NORMAL
LABORATORY COMMENT REPORT: NORMAL
Lab: NORMAL
PATH REPORT.FINAL DX SPEC: NORMAL
PATH REPORT.GROSS SPEC: NORMAL
PATH REPORT.RELEVANT HX SPEC: NORMAL
PATH REPORT.TOTAL CANCER: NORMAL

## 2024-10-03 ENCOUNTER — OFFICE VISIT (OUTPATIENT)
Dept: NEUROSURGERY | Facility: HOSPITAL | Age: 44
End: 2024-10-03
Payer: COMMERCIAL

## 2024-10-03 VITALS
HEART RATE: 102 BPM | SYSTOLIC BLOOD PRESSURE: 109 MMHG | RESPIRATION RATE: 18 BRPM | DIASTOLIC BLOOD PRESSURE: 72 MMHG | WEIGHT: 193 LBS | BODY MASS INDEX: 37.89 KG/M2 | HEIGHT: 60 IN

## 2024-10-03 DIAGNOSIS — D33.4 SCHWANNOMA OF SPINAL CORD (MULTI): Primary | ICD-10-CM

## 2024-10-03 PROCEDURE — 99213 OFFICE O/P EST LOW 20 MIN: CPT | Performed by: STUDENT IN AN ORGANIZED HEALTH CARE EDUCATION/TRAINING PROGRAM

## 2024-10-03 NOTE — PROGRESS NOTES
[facility] [place]  Department of Neurological Surgery  Established Patient Visit    History of Present Illness  Lynnette Baig is a 44 y.o. year old female who presents to the spine clinic in follow up from her cauda equina schwannoma on 9/16/2024. She states her pain is resolved and she is doing very well.      She has no bowel / bladder issues.     Patient's BMI is Body mass index is 37.69 kg/m².    14/14 systems reviewed and negative other than what is listed in the history of present illness    Patient Active Problem List   Diagnosis    Acute exacerbation of chronic low back pain    Intradural extramedullary spinal tumor    HTN (hypertension)     Past Medical History:   Diagnosis Date    Benign hypertension      Past Surgical History:   Procedure Laterality Date    ANKLE SURGERY Left     OTHER SURGICAL HISTORY Left     Thigh abscess surgery     Social History     Tobacco Use    Smoking status: Every Day     Types: Cigarettes    Smokeless tobacco: Never   Substance Use Topics    Alcohol use: Never     family history is not on file.    Current Outpatient Medications:     acetaminophen (Tylenol) 500 mg tablet, Take 2 tablets (1,000 mg) by mouth every 8 hours., Disp: , Rfl:     buprenorphine-naloxone (Suboxone) 2-0.5 mg per sublingual film, Place 1 Film under the tongue 2 times a day., Disp: 60 Film, Rfl: 0    lisinopriL-hydrochlorothiazide 20-25 mg tablet, Take 1 tablet by mouth once daily at bedtime., Disp: , Rfl:     cyclobenzaprine (Flexeril) 10 mg tablet, Take 1 tablet (10 mg) by mouth 3 times a day for 7 days., Disp: 21 tablet, Rfl: 0  Allergies   Allergen Reactions    Latex Hives       Physical Examination:    General: Well developed, awake/alert/oriented x3, no distress, alert and cooperative  Skin: Warm and dry, no lesions, no rashes  ENMT: Mucous membranes moist, no apparent injury, no lesions seen  Head/Neck: Neck Supple, no apparent injury  Respiratory/Thorax: Normal breath sounds  with good chest expansion, thorax symmetric  Cardiovascular: No pitting edema, no JVD    Motor Strength: 5/5 Throughout all extremities  , except a 5- in left dorsiflexion    Sensation: intact to light touch      Results:  No new imaging.     Assessment and Plan:  Lynnette Baig is a 44 y.o. year old female who presents to the spine clinic in follow up from her cauda equina schwannoma on 9/16/2024.     She has some very subtle residual weakness in dorsiflexion. Her pain has resolved.  Her incision is healing well.    We will plan for a 3 month post-op visit with a new MRI Lumbar with Contrast for new baseline imaging.         I have reviewed all prior documentation and reviewed the electronic medical record since admission. I have personally have reviewed all advanced imaging not just the reports and used my interpretation as documented as the relevant findings. I have reviewed the risks and benefits of all treatment recommendations listed in this note with the patient and family. I spent a total of 20 minutes in service to this patient's care during this date of service.      The above clinical summary has been dictated with voice recognition software. It has not been proofread for grammatical errors, typographical mistakes, or other semantic inconsistencies.    Thank you for visiting our office today. It was our pleasure to take part in your healthcare.     Do not hesitate to call with any questions regarding your plan of care after leaving at [phone] M-F 8am-4pm.     To clinicians, thank you very much for this kind referral. It is a privilege to partner with you in the care of your patients. My office would be delighted to assist you with any further consultations or with questions regarding the plan of care outlined. Do not hesitate to call the office or contact me directly.       Sincerely,      Gabe Thomas MD, PhD  Attending Neurosurgeon, [facility]  Assistant  Professor of Neurological Surgery  [facility] [employer]  Office: [phone]  Fax: [phone]    [facility]  [address]  [address] [id number]

## 2024-10-10 ENCOUNTER — HOSPITAL ENCOUNTER (OUTPATIENT)
Dept: RADIOLOGY | Facility: HOSPITAL | Age: 44
Discharge: HOME | End: 2024-10-10
Payer: COMMERCIAL

## 2024-10-10 VITALS — BODY MASS INDEX: 37.89 KG/M2 | WEIGHT: 193 LBS | HEIGHT: 60 IN

## 2024-10-10 DIAGNOSIS — Z12.31 SCREENING MAMMOGRAM FOR BREAST CANCER: ICD-10-CM

## 2024-10-10 PROCEDURE — 77067 SCR MAMMO BI INCL CAD: CPT

## 2024-10-22 ENCOUNTER — HOSPITAL ENCOUNTER (OUTPATIENT)
Dept: RADIOLOGY | Facility: EXTERNAL LOCATION | Age: 44
Discharge: HOME | End: 2024-10-22

## 2025-01-07 ENCOUNTER — HOSPITAL ENCOUNTER (OUTPATIENT)
Dept: RADIOLOGY | Facility: HOSPITAL | Age: 45
Discharge: HOME | End: 2025-01-07
Payer: COMMERCIAL

## 2025-01-07 DIAGNOSIS — D33.4 SCHWANNOMA OF SPINAL CORD (MULTI): ICD-10-CM

## 2025-01-07 PROCEDURE — 72158 MRI LUMBAR SPINE W/O & W/DYE: CPT

## 2025-01-07 PROCEDURE — 72158 MRI LUMBAR SPINE W/O & W/DYE: CPT | Performed by: RADIOLOGY

## 2025-01-07 PROCEDURE — A9575 INJ GADOTERATE MEGLUMI 0.1ML: HCPCS | Performed by: STUDENT IN AN ORGANIZED HEALTH CARE EDUCATION/TRAINING PROGRAM

## 2025-01-07 PROCEDURE — 2550000001 HC RX 255 CONTRASTS: Performed by: STUDENT IN AN ORGANIZED HEALTH CARE EDUCATION/TRAINING PROGRAM

## 2025-01-07 RX ORDER — GADOTERATE MEGLUMINE 376.9 MG/ML
18 INJECTION INTRAVENOUS
Status: COMPLETED | OUTPATIENT
Start: 2025-01-07 | End: 2025-01-07

## 2025-01-07 RX ADMIN — GADOTERATE MEGLUMINE 18 ML: 376.9 INJECTION INTRAVENOUS at 16:00

## 2025-01-09 ENCOUNTER — APPOINTMENT (OUTPATIENT)
Dept: NEUROSURGERY | Facility: HOSPITAL | Age: 45
End: 2025-01-09
Payer: COMMERCIAL

## 2025-02-06 ENCOUNTER — APPOINTMENT (OUTPATIENT)
Dept: NEUROSURGERY | Facility: HOSPITAL | Age: 45
End: 2025-02-06
Payer: COMMERCIAL

## 2025-02-06 ENCOUNTER — TELEPHONE (OUTPATIENT)
Dept: NEUROSURGERY | Facility: CLINIC | Age: 45
End: 2025-02-06
Payer: COMMERCIAL

## 2025-02-13 ENCOUNTER — OFFICE VISIT (OUTPATIENT)
Dept: NEUROSURGERY | Facility: HOSPITAL | Age: 45
End: 2025-02-13
Payer: COMMERCIAL

## 2025-02-13 VITALS — BODY MASS INDEX: 40.25 KG/M2 | HEIGHT: 60 IN | RESPIRATION RATE: 20 BRPM | WEIGHT: 205 LBS

## 2025-02-13 DIAGNOSIS — D33.4 SCHWANNOMA OF SPINAL CORD (MULTI): Primary | ICD-10-CM

## 2025-02-13 PROCEDURE — 3008F BODY MASS INDEX DOCD: CPT | Performed by: STUDENT IN AN ORGANIZED HEALTH CARE EDUCATION/TRAINING PROGRAM

## 2025-02-13 PROCEDURE — 99211 OFF/OP EST MAY X REQ PHY/QHP: CPT | Performed by: STUDENT IN AN ORGANIZED HEALTH CARE EDUCATION/TRAINING PROGRAM

## 2025-02-13 NOTE — PROGRESS NOTES
[facility] [place]  Department of Neurological Surgery  Post Operative Patient Visit      History of Present Illness:  Lynnette Baig is a 44 y.o. year old female who presents to the spine clinic in follow up from her L3-S1 laminectomy  for cauda equina schwannoma on 9/16/2024 at [facility].     Pre-Op  She endorses pain and numbness in her left leg, which has made ambulation difficult. Her left TA/EH was slightly weak at 5-/5.  She also endorses some intermittent pelvic numbness.  Further workup found a 3.7 cm lesion, concerning for a schwannoma,  compressing the cauda equina.  The decision was made to remove the lesion this admission to relieve pain and prevent progressive neurological decline.      10/3/24:  She stated her pain was resolved and  doing very well.       Her post-op MRI on 1/7/25 shows a gross total resection.         Today, her incision is well healed and her symptoms have resolved without any issues. She has some numbness in ankle and toes, but she attributes most to a previous ankle surgery.  She is full strength in her left leg.   She does not have to follow up further unless she has any issues and reach out to our office.     The above clinical summary has been dictated with voice recognition software. It has not been proofread for grammatical errors, typographical mistakes, or other semantic inconsistencies.    Thank you for visiting our office today. It was our pleasure to take part in your healthcare.     Do not hesitate to call with any questions regarding your plan of care after leaving at [phone] M-F 8am-4pm.     To clinicians, thank you very much for this kind referral. It is a privilege to partner with you in the care of your patients. My office would be delighted to assist you with any further consultations or with questions regarding the plan of care outlined. Do not hesitate to call the office or contact me directly.       Sincerely,      Gabe Thomas MD,  PhD  Attending Neurosurgeon, [facility]   of Neurological Surgery  [employer]  Office: [phone]  Fax: [phone]    [facility]  [address] [place] [address]  [address] [address]

## 2025-06-19 ENCOUNTER — HOSPITAL ENCOUNTER (EMERGENCY)
Facility: HOSPITAL | Age: 45
Discharge: HOME | End: 2025-06-19
Attending: EMERGENCY MEDICINE
Payer: COMMERCIAL

## 2025-06-19 ENCOUNTER — APPOINTMENT (OUTPATIENT)
Dept: CARDIOLOGY | Facility: HOSPITAL | Age: 45
End: 2025-06-19
Payer: COMMERCIAL

## 2025-06-19 VITALS
DIASTOLIC BLOOD PRESSURE: 80 MMHG | SYSTOLIC BLOOD PRESSURE: 140 MMHG | RESPIRATION RATE: 16 BRPM | HEIGHT: 59 IN | BODY MASS INDEX: 41.93 KG/M2 | TEMPERATURE: 98.7 F | OXYGEN SATURATION: 99 % | HEART RATE: 95 BPM | WEIGHT: 208 LBS

## 2025-06-19 DIAGNOSIS — T50.901A ACCIDENTAL DRUG OVERDOSE, INITIAL ENCOUNTER: Primary | ICD-10-CM

## 2025-06-19 LAB
ATRIAL RATE: 94 BPM
GLUCOSE BLD MANUAL STRIP-MCNC: 138 MG/DL (ref 74–99)
P AXIS: 39 DEGREES
P OFFSET: 180 MS
P ONSET: 127 MS
PR INTERVAL: 192 MS
Q ONSET: 223 MS
QRS COUNT: 16 BEATS
QRS DURATION: 82 MS
QT INTERVAL: 354 MS
QTC CALCULATION(BAZETT): 442 MS
QTC FREDERICIA: 411 MS
R AXIS: 34 DEGREES
T AXIS: 43 DEGREES
T OFFSET: 400 MS
VENTRICULAR RATE: 94 BPM

## 2025-06-19 PROCEDURE — 99284 EMERGENCY DEPT VISIT MOD MDM: CPT | Performed by: EMERGENCY MEDICINE

## 2025-06-19 PROCEDURE — 2500000004 HC RX 250 GENERAL PHARMACY W/ HCPCS (ALT 636 FOR OP/ED): Performed by: EMERGENCY MEDICINE

## 2025-06-19 PROCEDURE — 82947 ASSAY GLUCOSE BLOOD QUANT: CPT

## 2025-06-19 PROCEDURE — 96361 HYDRATE IV INFUSION ADD-ON: CPT

## 2025-06-19 PROCEDURE — 2500000004 HC RX 250 GENERAL PHARMACY W/ HCPCS (ALT 636 FOR OP/ED)

## 2025-06-19 PROCEDURE — 93005 ELECTROCARDIOGRAM TRACING: CPT

## 2025-06-19 PROCEDURE — 96374 THER/PROPH/DIAG INJ IV PUSH: CPT

## 2025-06-19 RX ORDER — ONDANSETRON HYDROCHLORIDE 2 MG/ML
4 INJECTION, SOLUTION INTRAVENOUS ONCE
Status: COMPLETED | OUTPATIENT
Start: 2025-06-19 | End: 2025-06-19

## 2025-06-19 RX ORDER — ONDANSETRON HYDROCHLORIDE 2 MG/ML
INJECTION, SOLUTION INTRAVENOUS
Status: COMPLETED
Start: 2025-06-19 | End: 2025-06-19

## 2025-06-19 RX ADMIN — ONDANSETRON 4 MG: 2 INJECTION INTRAMUSCULAR; INTRAVENOUS at 03:59

## 2025-06-19 RX ADMIN — SODIUM CHLORIDE 1000 ML: 9 INJECTION, SOLUTION INTRAVENOUS at 05:26

## 2025-06-19 RX ADMIN — SODIUM CHLORIDE 1000 ML: 9 INJECTION, SOLUTION INTRAVENOUS at 02:01

## 2025-06-19 RX ADMIN — ONDANSETRON HYDROCHLORIDE 4 MG: 2 INJECTION, SOLUTION INTRAVENOUS at 03:59

## 2025-06-19 ASSESSMENT — PAIN SCALES - GENERAL
PAINLEVEL_OUTOF10: 0 - NO PAIN

## 2025-06-19 ASSESSMENT — PAIN - FUNCTIONAL ASSESSMENT: PAIN_FUNCTIONAL_ASSESSMENT: 0-10

## 2025-06-19 NOTE — ED PROVIDER NOTES
Emergency Department Provider Note       History of Present Illness     History provided by: Patient  Limitations to History: None  External Records Reviewed with Brief Summary: None    HPI:  Lynnette Baig is a 45 y.o. female presents to the emergency department via EMS with her  for evaluation.  Patient was celebrating in a party and did some drugs.  She was given a dose of Narcan.  In the emergency department now feeling poorly.  Moaning intermittently.    Physical Exam   Triage vitals:  T 37.1 °C (98.7 °F)  HR 94  /66  RR 18  O2 100 % (S) None (Room air) (R at bedside to set-up suction just in case of aspiration.)    Physical Exam  Constitutional:       Comments: Intoxicated appearing   HENT:      Head: Normocephalic and atraumatic.      Mouth/Throat:      Mouth: Mucous membranes are moist.   Eyes:      Comments: Pinpoint pupils   Cardiovascular:      Rate and Rhythm: Normal rate and regular rhythm.   Pulmonary:      Effort: Pulmonary effort is normal.      Breath sounds: Normal breath sounds.   Abdominal:      Palpations: Abdomen is soft.      Tenderness: There is no abdominal tenderness.   Musculoskeletal:         General: Normal range of motion.   Skin:     General: Skin is warm and dry.   Neurological:      General: No focal deficit present.   Psychiatric:      Comments: Initially uncooperative.  Improving through the course           Medical Decision Making & ED Course   Medical Decision Makin y.o. female presents to the emergency department after drug overdose.  She did not provide a urine but her  who did the same substance was positive for cocaine, fentanyl and methamphetamine.  Patient has been hydrated and observed.  She is feeling better after 5 hours observation.  Discussed with patient's risks of drug ingestion.  Offered resources for outpatient follow-up.  Patient discharged in improved condition.  The drug overdose was accidental.  Not intentional.  They thought  they were doing only cocaine.  ----      Differential diagnoses considered include but are not limited to: Drug overdose intentional versus accidental    Social Determinants of Health which Significantly Impact Care: Social Determinants of Health which Significantly Impact Care: Substance use disorder     EKG Independent Interpretation: EKG interpreted by myself. Please see ED Course for full interpretation.    Independent Result Review and Interpretation: Relevant laboratory and radiographic results were reviewed and independently interpreted by myself.  As necessary, they are commented on in the ED Course.    Chronic conditions affecting the patient's care: As documented above in [facility]    The patient was discussed with the following consultants/services: None    Care Considerations: As documented above in [facility]    ED Course:  ED Course as of 06/19/25 0641   Thu Jun 19, 2025   0139 EKG done at 133 which I reviewed and impelled interpreted reveals normal sinus rhythm at a rate of 94 with baseline artifact, borderline R wave progression, normal intervals, normal axis, no ischemia, no STEMI. [RM]      ED Course User Index  [RM] Link Perez MD         Diagnoses as of 06/19/25 0641   Accidental drug overdose, initial encounter       Disposition   As a result of the work-up, the patient was discharged home.  she was informed of her diagnosis and instructed to come back with any concerns or worsening of condition.  she and was agreeable to the plan as discussed above.  she was given the opportunity to ask questions.  All of the patient's questions were answered.    Procedures   Procedures        Link Perez MD  Emergency Medicine                                                       Link Perez MD  06/19/25 0641

## 2025-06-19 NOTE — ED NOTES
Patient is alert and talking, stable vitals but sleepy.  Her son came back to the room and she requested him leave because 'I don't want you to see me like this.'  He left the room.       Maria Ines Byrnes RN  06/19/25 0216    
negative...

## 2025-06-19 NOTE — ED TRIAGE NOTES
Patient presents to the ED via EMS as an overdose.  Patient is alert and oriented and states she feels stuff crawling all over her skin.  Patient states she and her  took what they believed to be cocaine around 5-6pm.   Vitals are stable at this time.

## 2025-07-18 LAB
ATRIAL RATE: 94 BPM
P AXIS: 39 DEGREES
P OFFSET: 180 MS
P ONSET: 127 MS
PR INTERVAL: 192 MS
Q ONSET: 223 MS
QRS COUNT: 16 BEATS
QRS DURATION: 82 MS
QT INTERVAL: 354 MS
QTC CALCULATION(BAZETT): 442 MS
QTC FREDERICIA: 411 MS
R AXIS: 34 DEGREES
T AXIS: 43 DEGREES
T OFFSET: 400 MS
VENTRICULAR RATE: 94 BPM

## (undated) DEVICE — NEEDLE, ELECTRODE, SUBDERMAL, PAIRED, 2.0 LEAD, DISP

## (undated) DEVICE — CAUTERY, PENCIL, PUSH BUTTON, SMOKE EVAC, 70MM

## (undated) DEVICE — SEALANT, HEMOSTATIC, FLOSEAL, 10 ML

## (undated) DEVICE — GOWN, SURGICAL, SMARTGOWN, XLARGE, STERILE

## (undated) DEVICE — ELECTRODE, ELECTROSURGICAL, BLADE, INSULATED, ENT/IMA, STERILE

## (undated) DEVICE — SUTURE, VICRYL, 0, 18 IN, CT-1, UNDYED

## (undated) DEVICE — SPONGE, LAP, XRAY DECT, SC+RFID, 12X12, STERILE

## (undated) DEVICE — DRESSING, MEPILEX, BORDER FLEX, 6 X 8

## (undated) DEVICE — COVER, CART, 45 X 27 X 48 IN, CLEAR

## (undated) DEVICE — TUBING, SMOKE EVAC, 3/8 X 10 FT

## (undated) DEVICE — PROBE, STIMULATOR, MONOPOLAR, FLUSH TIP, PRASS, W/HANDLE, STANDARD

## (undated) DEVICE — DRAPE, SHEET, FAN FOLDED, HALF, 44 X 58 IN, DISPOSABLE, LF, STERILE

## (undated) DEVICE — ELECTRODE, CORKSCREW NEEDLE 1.5M LENGTH

## (undated) DEVICE — DRESSING, MEPILEX, POST OP, 8 X 4

## (undated) DEVICE — NEEDLE, ELECTRODE, SUBDERMAL,SINGLE, 200CM LEAD, DISP

## (undated) DEVICE — DRAIN, WOUND, ROUND, W/TROCAR, HOLE PATTERN, 10 IN, MEDIUM, 1/8 X 49 IN

## (undated) DEVICE — SPHERE, STEALTHSTATION, 5-PK

## (undated) DEVICE — MANIFOLD, 4 PORT NEPTUNE STANDARD

## (undated) DEVICE — DRESSING, MEPILEX, BORDER FLEX, 3 X 3

## (undated) DEVICE — COVER, TABLE, UHC

## (undated) DEVICE — CASSETTE, SONOPET IQ IRRIGATION SUCTION

## (undated) DEVICE — SUTURE, SILK, 2-0, 30 IN, SH, BLACK

## (undated) DEVICE — CLOSURE SYSTEM, DERMABOND, PRINEO, 22CM, STERILE

## (undated) DEVICE — SPONGE GAUZE, XRAY SC+RFID, 4X4 16 PLY, STERILE

## (undated) DEVICE — APPLICATOR, CHLORAPREP, W/ORANGE TINT, 26ML

## (undated) DEVICE — WOUND SYSTEM, DEBRIDEMENT & CLEANING, O.R DUOPAK

## (undated) DEVICE — ELECTRODE, ELECTROSURGICAL, BLADE EXT 4 INCH, INSULATED

## (undated) DEVICE — SEALANT, DURASEAL EXACT, 5ML

## (undated) DEVICE — BUR, 3MM X 3.8MM, PRECISION, NEURO DRILL

## (undated) DEVICE — Device

## (undated) DEVICE — KIT, PATIENT CARE, JACKSON TABLE W/PRONE-SAFE HEADREST

## (undated) DEVICE — SEALER, BIPOLAR, AQUA MANTYS 6.0

## (undated) DEVICE — EVACUATOR, WOUND, CLOSED, 3 SPRING, 400 CC, Y CONNECTING TUBE

## (undated) DEVICE — PAD, GROUNDING, ELECTROSURGICAL, W/9 FT CABLE, POLYHESIVE II, ADULT, LF

## (undated) DEVICE — TIP, SONOPET IQ, 12CM

## (undated) DEVICE — SPONGE, HEMOSTATIC, GELATIN, SURGIFOAM, 8 X 12.5 CM X 10 MM

## (undated) DEVICE — CATHETER TRAY, SURESTEP, 16FR, URINE METER W/STATLOCK

## (undated) DEVICE — MARKER, SKIN, RULER AND LABEL PACK, CUSTOM